# Patient Record
Sex: MALE | Race: WHITE | NOT HISPANIC OR LATINO
[De-identification: names, ages, dates, MRNs, and addresses within clinical notes are randomized per-mention and may not be internally consistent; named-entity substitution may affect disease eponyms.]

---

## 2017-01-20 ENCOUNTER — MEDICATION RENEWAL (OUTPATIENT)
Age: 22
End: 2017-01-20

## 2017-01-24 ENCOUNTER — APPOINTMENT (OUTPATIENT)
Dept: ENDOCRINOLOGY | Facility: CLINIC | Age: 22
End: 2017-01-24

## 2017-01-24 VITALS
BODY MASS INDEX: 31.23 KG/M2 | WEIGHT: 199 LBS | SYSTOLIC BLOOD PRESSURE: 116 MMHG | HEART RATE: 78 BPM | OXYGEN SATURATION: 98 % | HEIGHT: 67 IN | DIASTOLIC BLOOD PRESSURE: 78 MMHG

## 2017-01-24 LAB
GLUCOSE BLDC GLUCOMTR-MCNC: 220
HBA1C MFR BLD HPLC: 10.3

## 2017-03-20 ENCOUNTER — APPOINTMENT (OUTPATIENT)
Dept: ENDOCRINOLOGY | Facility: CLINIC | Age: 22
End: 2017-03-20

## 2017-05-23 ENCOUNTER — APPOINTMENT (OUTPATIENT)
Dept: ENDOCRINOLOGY | Facility: CLINIC | Age: 22
End: 2017-05-23

## 2017-05-23 VITALS
DIASTOLIC BLOOD PRESSURE: 80 MMHG | OXYGEN SATURATION: 97 % | HEIGHT: 67 IN | SYSTOLIC BLOOD PRESSURE: 120 MMHG | WEIGHT: 192 LBS | BODY MASS INDEX: 30.13 KG/M2 | HEART RATE: 100 BPM

## 2017-05-23 LAB
GLUCOSE BLDC GLUCOMTR-MCNC: 278
HBA1C MFR BLD HPLC: 12.7

## 2017-05-26 LAB
25(OH)D3 SERPL-MCNC: 23.8 NG/ML
ALBUMIN SERPL ELPH-MCNC: 4.6 G/DL
ALP BLD-CCNC: 108 U/L
ALT SERPL-CCNC: 22 U/L
ANION GAP SERPL CALC-SCNC: 21 MMOL/L
AST SERPL-CCNC: 21 U/L
BASOPHILS # BLD AUTO: 0.02 K/UL
BASOPHILS NFR BLD AUTO: 0.2 %
BILIRUB SERPL-MCNC: 0.6 MG/DL
BUN SERPL-MCNC: 15 MG/DL
CALCIUM SERPL-MCNC: 10.1 MG/DL
CHLORIDE SERPL-SCNC: 98 MMOL/L
CHOLEST SERPL-MCNC: 247 MG/DL
CHOLEST/HDLC SERPL: 4.8 RATIO
CO2 SERPL-SCNC: 20 MMOL/L
CREAT SERPL-MCNC: 0.74 MG/DL
CREAT SPEC-SCNC: 73 MG/DL
EOSINOPHIL # BLD AUTO: 0.03 K/UL
EOSINOPHIL NFR BLD AUTO: 0.3 %
GLUCOSE SERPL-MCNC: 353 MG/DL
HCT VFR BLD CALC: 49 %
HDLC SERPL-MCNC: 51 MG/DL
HGB BLD-MCNC: 16.7 G/DL
IMM GRANULOCYTES NFR BLD AUTO: 0.2 %
LDLC SERPL CALC-MCNC: 159 MG/DL
LYMPHOCYTES # BLD AUTO: 1.77 K/UL
LYMPHOCYTES NFR BLD AUTO: 16.3 %
MAN DIFF?: NORMAL
MCHC RBC-ENTMCNC: 29.4 PG
MCHC RBC-ENTMCNC: 34.1 GM/DL
MCV RBC AUTO: 86.3 FL
MICROALBUMIN 24H UR DL<=1MG/L-MCNC: 1.4 MG/DL
MICROALBUMIN/CREAT 24H UR-RTO: 19
MONOCYTES # BLD AUTO: 0.44 K/UL
MONOCYTES NFR BLD AUTO: 4 %
NEUTROPHILS # BLD AUTO: 8.6 K/UL
NEUTROPHILS NFR BLD AUTO: 79 %
PLATELET # BLD AUTO: 218 K/UL
POTASSIUM SERPL-SCNC: 4.3 MMOL/L
PROT SERPL-MCNC: 7.5 G/DL
RBC # BLD: 5.68 M/UL
RBC # FLD: 12.1 %
SODIUM SERPL-SCNC: 139 MMOL/L
T4 FREE SERPL-MCNC: 1.4 NG/DL
THYROGLOB AB SERPL-ACNC: <20 IU/ML
THYROPEROXIDASE AB SERPL IA-ACNC: <10 IU/ML
TRIGL SERPL-MCNC: 185 MG/DL
TSH SERPL-ACNC: 2.26 UIU/ML
TTG IGA SER IA-ACNC: 15.9 UNITS
TTG IGA SER-ACNC: NEGATIVE
TTG IGG SER IA-ACNC: <5 UNITS
TTG IGG SER IA-ACNC: NEGATIVE
WBC # FLD AUTO: 10.88 K/UL

## 2017-06-07 ENCOUNTER — RX RENEWAL (OUTPATIENT)
Age: 22
End: 2017-06-07

## 2017-06-29 ENCOUNTER — APPOINTMENT (OUTPATIENT)
Dept: ENDOCRINOLOGY | Facility: CLINIC | Age: 22
End: 2017-06-29

## 2017-07-28 ENCOUNTER — MEDICATION RENEWAL (OUTPATIENT)
Age: 22
End: 2017-07-28

## 2017-10-03 ENCOUNTER — MEDICATION RENEWAL (OUTPATIENT)
Age: 22
End: 2017-10-03

## 2018-02-07 ENCOUNTER — APPOINTMENT (OUTPATIENT)
Dept: ENDOCRINOLOGY | Facility: CLINIC | Age: 23
End: 2018-02-07
Payer: MEDICAID

## 2018-02-07 VITALS
DIASTOLIC BLOOD PRESSURE: 72 MMHG | BODY MASS INDEX: 30.13 KG/M2 | WEIGHT: 192 LBS | HEART RATE: 82 BPM | SYSTOLIC BLOOD PRESSURE: 116 MMHG | HEIGHT: 67 IN | OXYGEN SATURATION: 98 %

## 2018-02-07 LAB
GLUCOSE BLDC GLUCOMTR-MCNC: 200
HBA1C MFR BLD HPLC: 13.7

## 2018-02-07 PROCEDURE — 83036 HEMOGLOBIN GLYCOSYLATED A1C: CPT | Mod: QW

## 2018-02-07 PROCEDURE — 99215 OFFICE O/P EST HI 40 MIN: CPT | Mod: 25

## 2018-02-07 PROCEDURE — 82962 GLUCOSE BLOOD TEST: CPT

## 2018-02-07 RX ORDER — LANCING DEVICE/LANCETS
KIT MISCELLANEOUS
Qty: 1 | Refills: 5 | Status: ACTIVE | COMMUNITY
Start: 2017-10-03 | End: 1900-01-01

## 2018-02-15 ENCOUNTER — RX RENEWAL (OUTPATIENT)
Age: 23
End: 2018-02-15

## 2018-03-15 ENCOUNTER — RX RENEWAL (OUTPATIENT)
Age: 23
End: 2018-03-15

## 2018-06-19 ENCOUNTER — LABORATORY RESULT (OUTPATIENT)
Age: 23
End: 2018-06-19

## 2018-06-19 ENCOUNTER — APPOINTMENT (OUTPATIENT)
Dept: ENDOCRINOLOGY | Facility: CLINIC | Age: 23
End: 2018-06-19
Payer: MEDICAID

## 2018-06-19 VITALS
OXYGEN SATURATION: 98 % | HEIGHT: 67 IN | BODY MASS INDEX: 28.88 KG/M2 | WEIGHT: 184 LBS | DIASTOLIC BLOOD PRESSURE: 82 MMHG | RESPIRATION RATE: 16 BRPM | SYSTOLIC BLOOD PRESSURE: 128 MMHG | HEART RATE: 106 BPM

## 2018-06-19 LAB
GLUCOSE BLDC GLUCOMTR-MCNC: 279
HBA1C MFR BLD HPLC: 11.2

## 2018-06-19 PROCEDURE — 82962 GLUCOSE BLOOD TEST: CPT

## 2018-06-19 PROCEDURE — 83036 HEMOGLOBIN GLYCOSYLATED A1C: CPT | Mod: QW

## 2018-06-19 PROCEDURE — 95251 CONT GLUC MNTR ANALYSIS I&R: CPT

## 2018-06-19 PROCEDURE — 99215 OFFICE O/P EST HI 40 MIN: CPT | Mod: 25

## 2018-06-21 ENCOUNTER — RX RENEWAL (OUTPATIENT)
Age: 23
End: 2018-06-21

## 2018-06-21 LAB
25(OH)D3 SERPL-MCNC: 27.7 NG/ML
ALBUMIN SERPL ELPH-MCNC: 4.7 G/DL
ALP BLD-CCNC: 84 U/L
ALT SERPL-CCNC: 16 U/L
ANION GAP SERPL CALC-SCNC: 19 MMOL/L
AST SERPL-CCNC: 18 U/L
BASOPHILS # BLD AUTO: 0.02 K/UL
BASOPHILS NFR BLD AUTO: 0.2 %
BILIRUB SERPL-MCNC: 0.4 MG/DL
BUN SERPL-MCNC: 13 MG/DL
CALCIUM SERPL-MCNC: 10.2 MG/DL
CHLORIDE SERPL-SCNC: 99 MMOL/L
CHOLEST SERPL-MCNC: 228 MG/DL
CHOLEST/HDLC SERPL: 3.9 RATIO
CO2 SERPL-SCNC: 26 MMOL/L
CREAT SERPL-MCNC: 0.79 MG/DL
CREAT SPEC-SCNC: 72 MG/DL
EOSINOPHIL # BLD AUTO: 0.04 K/UL
EOSINOPHIL NFR BLD AUTO: 0.4 %
GLUCOSE SERPL-MCNC: 282 MG/DL
HCT VFR BLD CALC: 51.6 %
HDLC SERPL-MCNC: 59 MG/DL
HGB BLD-MCNC: 17.2 G/DL
IMM GRANULOCYTES NFR BLD AUTO: 0.2 %
LDLC SERPL CALC-MCNC: 140 MG/DL
LYMPHOCYTES # BLD AUTO: 2.31 K/UL
LYMPHOCYTES NFR BLD AUTO: 22.1 %
MAN DIFF?: NORMAL
MCHC RBC-ENTMCNC: 29.2 PG
MCHC RBC-ENTMCNC: 33.3 GM/DL
MCV RBC AUTO: 87.5 FL
MICROALBUMIN 24H UR DL<=1MG/L-MCNC: 1.5 MG/DL
MICROALBUMIN/CREAT 24H UR-RTO: 21 MG/G
MONOCYTES # BLD AUTO: 0.49 K/UL
MONOCYTES NFR BLD AUTO: 4.7 %
NEUTROPHILS # BLD AUTO: 7.57 K/UL
NEUTROPHILS NFR BLD AUTO: 72.4 %
PLATELET # BLD AUTO: 238 K/UL
POTASSIUM SERPL-SCNC: 4.2 MMOL/L
PROT SERPL-MCNC: 7.9 G/DL
RBC # BLD: 5.9 M/UL
RBC # FLD: 12.2 %
SODIUM SERPL-SCNC: 144 MMOL/L
T4 FREE SERPL-MCNC: 1.4 NG/DL
THYROGLOB AB SERPL-ACNC: <20 IU/ML
THYROPEROXIDASE AB SERPL IA-ACNC: <10 IU/ML
TRIGL SERPL-MCNC: 144 MG/DL
TSH SERPL-ACNC: 2.23 UIU/ML
TTG IGA SER IA-ACNC: 23.5 UNITS
TTG IGA SER-ACNC: ABNORMAL
TTG IGG SER IA-ACNC: <5 UNITS
TTG IGG SER IA-ACNC: NEGATIVE
WBC # FLD AUTO: 10.45 K/UL

## 2018-06-21 RX ORDER — BLOOD-GLUCOSE METER
W/DEVICE KIT MISCELLANEOUS
Qty: 1 | Refills: 0 | Status: COMPLETED | COMMUNITY
Start: 2018-06-19 | End: 2018-06-21

## 2018-06-21 RX ORDER — BLOOD SUGAR DIAGNOSTIC
STRIP MISCELLANEOUS
Qty: 6 | Refills: 5 | Status: ACTIVE | COMMUNITY
Start: 2018-06-21 | End: 1900-01-01

## 2018-06-21 RX ORDER — LANCETS 28 GAUGE
EACH MISCELLANEOUS
Qty: 2 | Refills: 3 | Status: ACTIVE | COMMUNITY
Start: 2018-06-21 | End: 1900-01-01

## 2018-06-21 RX ORDER — BLOOD-GLUCOSE METER
KIT MISCELLANEOUS
Qty: 1 | Refills: 4 | Status: ACTIVE | COMMUNITY
Start: 2018-06-21 | End: 1900-01-01

## 2018-06-22 ENCOUNTER — RX RENEWAL (OUTPATIENT)
Age: 23
End: 2018-06-22

## 2018-06-22 RX ORDER — LANCETS
EACH MISCELLANEOUS
Qty: 100 | Refills: 0 | Status: ACTIVE | COMMUNITY
Start: 2018-06-22 | End: 1900-01-01

## 2018-08-06 ENCOUNTER — MEDICATION RENEWAL (OUTPATIENT)
Age: 23
End: 2018-08-06

## 2018-10-29 ENCOUNTER — APPOINTMENT (OUTPATIENT)
Dept: ENDOCRINOLOGY | Facility: CLINIC | Age: 23
End: 2018-10-29
Payer: MEDICAID

## 2018-10-29 VITALS — HEART RATE: 115 BPM | DIASTOLIC BLOOD PRESSURE: 80 MMHG | OXYGEN SATURATION: 98 % | SYSTOLIC BLOOD PRESSURE: 120 MMHG

## 2018-10-29 LAB — GLUCOSE BLDC GLUCOMTR-MCNC: 242

## 2018-10-29 PROCEDURE — 90686 IIV4 VACC NO PRSV 0.5 ML IM: CPT

## 2018-10-29 PROCEDURE — G0008: CPT

## 2018-10-29 PROCEDURE — 82962 GLUCOSE BLOOD TEST: CPT

## 2018-10-29 PROCEDURE — 99215 OFFICE O/P EST HI 40 MIN: CPT | Mod: 25

## 2018-10-29 PROCEDURE — 95251 CONT GLUC MNTR ANALYSIS I&R: CPT

## 2018-11-12 ENCOUNTER — RX RENEWAL (OUTPATIENT)
Age: 23
End: 2018-11-12

## 2018-11-15 ENCOUNTER — CLINICAL ADVICE (OUTPATIENT)
Age: 23
End: 2018-11-15

## 2018-12-03 ENCOUNTER — APPOINTMENT (OUTPATIENT)
Dept: ENDOCRINOLOGY | Facility: CLINIC | Age: 23
End: 2018-12-03
Payer: COMMERCIAL

## 2018-12-03 PROCEDURE — P0006: CPT

## 2018-12-03 RX ORDER — BLOOD-GLUCOSE METER
EACH MISCELLANEOUS
Qty: 1 | Refills: 0 | Status: ACTIVE | COMMUNITY
Start: 2018-12-03 | End: 1900-01-01

## 2018-12-03 RX ORDER — BLOOD KETONE TEST, STRIPS
STRIP MISCELLANEOUS
Qty: 2 | Refills: 1 | Status: ACTIVE | COMMUNITY
Start: 2018-12-03 | End: 1900-01-01

## 2019-01-10 ENCOUNTER — MEDICATION RENEWAL (OUTPATIENT)
Age: 24
End: 2019-01-10

## 2019-01-14 ENCOUNTER — RX RENEWAL (OUTPATIENT)
Age: 24
End: 2019-01-14

## 2019-01-14 RX ORDER — 70%ISOPROPYL ALCOHOL 0.7 ML/ML
70 SWAB TOPICAL
Qty: 3 | Refills: 3 | Status: ACTIVE | COMMUNITY
Start: 2019-01-14 | End: 1900-01-01

## 2019-01-28 ENCOUNTER — APPOINTMENT (OUTPATIENT)
Dept: ENDOCRINOLOGY | Facility: CLINIC | Age: 24
End: 2019-01-28
Payer: MEDICAID

## 2019-01-28 VITALS — WEIGHT: 180 LBS | OXYGEN SATURATION: 98 % | HEART RATE: 98 BPM

## 2019-01-28 LAB
GLUCOSE BLDC GLUCOMTR-MCNC: 189
HBA1C MFR BLD HPLC: 9.3

## 2019-01-28 PROCEDURE — 83036 HEMOGLOBIN GLYCOSYLATED A1C: CPT | Mod: QW

## 2019-01-28 PROCEDURE — 82962 GLUCOSE BLOOD TEST: CPT

## 2019-01-28 PROCEDURE — 99215 OFFICE O/P EST HI 40 MIN: CPT | Mod: 25

## 2019-01-28 PROCEDURE — 95251 CONT GLUC MNTR ANALYSIS I&R: CPT

## 2019-01-28 NOTE — REVIEW OF SYSTEMS
[All other systems negative] : All other systems negative [Fatigue] : no fatigue [Recent Weight Gain (___ Lbs)] : no recent weight gain [Recent Weight Loss (___ Lbs)] : no recent weight loss [Blurry Vision] : no blurred vision [Dysphagia] : no dysphagia [Dysphonia] : no dysphonia [Chest Pain] : no chest pain [Shortness Of Breath] : no shortness of breath [Nausea] : no nausea [Vomiting] : no vomiting was observed [Cold Intolerance] : cold tolerant [Heat Intolerance] : heat tolerant

## 2019-01-28 NOTE — DATA REVIEWED
[FreeTextEntry1] : DEXCOM downloaded and reviewed personally 1/28/19: Hyperglycemia persistently, but improving without hypoglycemia.\par \par Labs 1/28/19:\par A1c 9.3%\par Glucose 189\par \par Labs 12/31/18:\par A1c 10.4%\par Glucose 325\par CMP normal\par Chol 235\par HDL 62\par \par Trig 97\par Vit D 25-OH 27\par \par DEXCOM downloaded and reviewed personally 10/29/18: Hyperglycemia without hypoglycemia.\par \par Labs 10/29/18:\par A1c 10.4%\par Glucose 242\par \par DEXCOM download: Hyperglycemia mostly after dinner. No hypoglycemia. \par \par Labs 6/19/18:\par A1c 11.2%\par Glucose 279\par Micro/cr 21\par CMP normal except glucose of 282\par \par HDL 59\par Chol 228\par Trig 144\par Vit D 27.7\par TSH 2.23\par Free T4 1.4\par TPO Ab neg.\par Celiac screen +\par \par Labs 2/7/18:\par Glucose 200\par A1c 13.7%\par \par Sensor Download 5/23/17: Glucose values 200-400 consistently\par \par Labs 5/23/17:\par A1c 12.7%\par Glucose 278\par TSH 2.26\par Free T4 1.4\par Glucose 353\par \par HDL 51\par Chol 247\par Trig 185\par Micro/Cr 19\par Vit D 23.8\par Celiac neg.\par TPO Ab neg. \par \par Labs 5/2016:\par A1c 10.1%\par Glucose 220\par \par Labs 2/22/15:\par A1c 11%\par Glucose 142\par \par Reviewed pump download 2/22/16: Most hyperglycemia in afternoon and evening- related to postprandial excursions. \par \par Labs 12/2015:\par A1c >14%\par Celiac negative\par Micro/Cr 9\par \par HDL 58\par Chol 237\par Trig 140\par CMP normal except glucose of 247\par TSH 2.14\par Free T4 1.3\par \par Reviewed pump down load with persistent hyperglycemia glucose values 200-400 without hypoglycemia.

## 2019-01-28 NOTE — ASSESSMENT
[Carbohydrate Consistent Diet] : carbohydrate consistent diet [Hypoglycemia Management] : hypoglycemia management [Diabetes Foot Care] : diabetes foot care [Long Term Vascular Complications] : long term vascular complications of diabetes [Importance of Diet and Exercise] : importance of diet and exercise to improve glycemic control, achieve weight loss and improve cardiovascular health [Self Monitoring of Blood Glucose] : self monitoring of blood glucose [Retinopathy Screening] : Patient was referred to ophthalmology for retinopathy screening [FreeTextEntry1] : 24 y/o M w/ uncontrolled Type 1 DM on insulin pump with A1c 9.3% (high risk patient with high level decision-making) Discussed the importance of improved glycemic control to prevent long term complications from diabetes. Goal A1c <7%. A1c today 9.3% likely due to nonadherence of carb counting for fear of hypoglycemia. Recommend decrease in insulin doses to allow slow titration where he feels comfortable bolusing recommended amount as he does not bolus full amounts anyway.  Basal rate as indicated:\par \par 12am 1.7\par 6am 1.8\par 9:30am 1.9\par 1pm 2.1\par 6pm 2.1\par 9pm 2.1\par \par c/w I:C at 12am 9, 9 am 3, and 13:30 3.5. \par ChangeD sensitivity to 50 starting at 10 pm until 6 am to avoid overcorrection overnight when he seems to be more sensitive. Change target to 120-140 given fear of hypoglycemia. \par \par Dietary and lifestyle modifications discussed. Goal glucose values reviewed. Hypoglycemia management discussed. BP at goal. micro/cr at goal and negative 11/2017. Repeat labs 6/2019. Has glucagon and ketone strips. Medical alert bracelet recommended. Optho follow-up for retinopathy screening. \par GI follow-up for celiac + screen\par Influenza vaccine given 10/2018.

## 2019-01-28 NOTE — PHYSICAL EXAM
[Alert] : alert [No Acute Distress] : no acute distress [Well Nourished] : well nourished [Well Developed] : well developed [Normal Sclera/Conjunctiva] : normal sclera/conjunctiva [EOMI] : extra ocular movement intact [Normal Oropharynx] : the oropharynx was normal [Supple] : the neck was supple [Thyroid Not Enlarged] : the thyroid was not enlarged [No Respiratory Distress] : no respiratory distress [Normal Rate and Effort] : normal respiratory rhythm and effort [No Accessory Muscle Use] : no accessory muscle use [Clear to Auscultation] : lungs were clear to auscultation bilaterally [Normal S1, S2] : normal S1 and S2 [Regular Rhythm] : with a regular rhythm [No Edema] : there was no peripheral edema [Normal Bowel Sounds] : normal bowel sounds [Not Tender] : non-tender [Soft] : abdomen soft [Not Distended] : not distended [No Stigmata of Cushings Syndrome] : no stigmata of cushings syndrome [Normal Gait] : normal gait [No Clubbing, Cyanosis] : no clubbing  or cyanosis of the fingernails [Normal Strength/Tone] : muscle strength and tone were normal [No Tremors] : no tremors [Oriented x3] : oriented to person, place, and time [Normal Affect] : the affect was normal [Normal Mood] : the mood was normal [Kyphosis] : no kyphosis present [Acanthosis Nigricans] : no acanthosis nigricans [de-identified] : +tachycardic [de-identified] : 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

## 2019-02-11 ENCOUNTER — MEDICATION RENEWAL (OUTPATIENT)
Age: 24
End: 2019-02-11

## 2019-05-06 ENCOUNTER — APPOINTMENT (OUTPATIENT)
Dept: ENDOCRINOLOGY | Facility: CLINIC | Age: 24
End: 2019-05-06
Payer: MEDICAID

## 2019-05-06 ENCOUNTER — RESULT CHARGE (OUTPATIENT)
Age: 24
End: 2019-05-06

## 2019-05-06 VITALS
OXYGEN SATURATION: 98 % | HEIGHT: 67 IN | BODY MASS INDEX: 28.25 KG/M2 | WEIGHT: 180 LBS | SYSTOLIC BLOOD PRESSURE: 118 MMHG | HEART RATE: 102 BPM | DIASTOLIC BLOOD PRESSURE: 80 MMHG

## 2019-05-06 LAB — HBA1C MFR BLD HPLC: 9.4

## 2019-05-06 PROCEDURE — 99215 OFFICE O/P EST HI 40 MIN: CPT

## 2019-05-06 RX ORDER — INSULIN LISPRO 100 U/ML
100 INJECTION, SOLUTION INTRAVENOUS; SUBCUTANEOUS
Qty: 7 | Refills: 1 | Status: COMPLETED | COMMUNITY
Start: 2019-01-11 | End: 2019-05-06

## 2019-05-06 NOTE — PHYSICAL EXAM
[Well Nourished] : well nourished [No Acute Distress] : no acute distress [Alert] : alert [Normal Sclera/Conjunctiva] : normal sclera/conjunctiva [EOMI] : extra ocular movement intact [Well Developed] : well developed [Normal Oropharynx] : the oropharynx was normal [Supple] : the neck was supple [Thyroid Not Enlarged] : the thyroid was not enlarged [No Respiratory Distress] : no respiratory distress [Normal Rate and Effort] : normal respiratory rhythm and effort [No Accessory Muscle Use] : no accessory muscle use [Normal S1, S2] : normal S1 and S2 [Clear to Auscultation] : lungs were clear to auscultation bilaterally [Regular Rhythm] : with a regular rhythm [No Edema] : there was no peripheral edema [Not Tender] : non-tender [Normal Bowel Sounds] : normal bowel sounds [Not Distended] : not distended [Soft] : abdomen soft [Normal Gait] : normal gait [No Stigmata of Cushings Syndrome] : no stigmata of cushings syndrome [No Clubbing, Cyanosis] : no clubbing  or cyanosis of the fingernails [No Tremors] : no tremors [Normal Strength/Tone] : muscle strength and tone were normal [Normal Affect] : the affect was normal [Oriented x3] : oriented to person, place, and time [Normal Mood] : the mood was normal [Kyphosis] : no kyphosis present [de-identified] : +tachycardic [de-identified] : 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

## 2019-05-06 NOTE — ASSESSMENT
[Hypoglycemia Management] : hypoglycemia management [Carbohydrate Consistent Diet] : carbohydrate consistent diet [Diabetes Foot Care] : diabetes foot care [Long Term Vascular Complications] : long term vascular complications of diabetes [Importance of Diet and Exercise] : importance of diet and exercise to improve glycemic control, achieve weight loss and improve cardiovascular health [Self Monitoring of Blood Glucose] : self monitoring of blood glucose [Retinopathy Screening] : Patient was referred to ophthalmology for retinopathy screening [FreeTextEntry1] : 24 y/o M w/ uncontrolled Type 1 DM on insulin pump with A1c 9.4% (high risk patient with high level decision-making) Discussed the importance of improved glycemic control to prevent long term complications from diabetes. Goal A1c <7%. A1c today 9.4% likely due to nonadherence of carb counting for fear of hypoglycemia. Recommend decrease in insulin doses to allow slow titration where he feels comfortable bolusing recommended amount as he does not bolus full amounts anyway.  Basal rate as indicated:\par \par Pump Settings: \par Basal Rate: \par Start Time: 12:00 AM ----- 1.8 units/hour \par Start Time: 630A -----  1.8 units/hour \par Start Time: 9A -----  1.9 units/hour \par Start Time: 1P ----- 2.1 units/hour \par Start Time: 6P -----  2.1 units/hour \par Start Time: 9P -----  \par 2.1 units/hour \par Start Time: 10P -----  2.1 units/hour   \par Insulin to Carb Ration (I:C): \par Start Time: 12:00 AM ----- 9 units/hour \par Start Time: -----  units/hour \par Start Time: 9A -----  3 units/hour \par Start Time: 130PM -----  3.5 units/hour      \par Insulin Sensitivity Factor = 12 A 50, 630A 30, 10 P 50 \par BG Target = 140 \par Insulin on Board (IOB) Duration = 4 hours \par Auto off set = 18 hours \par Maximum Basal Rate = units/hour \par Maximum Bolus = 20 units \par \par Dietary and lifestyle modifications discussed. Goal glucose values reviewed. Hypoglycemia management discussed. BP at goal. micro/cr at goal and negative 11/2017. Repeat labs 6/2019. Has glucagon and ketone strips. Medical alert bracelet recommended. Optho follow-up for retinopathy screening. \par GI follow-up for celiac + screen\par Influenza vaccine given 10/2018.

## 2019-05-06 NOTE — HISTORY OF PRESENT ILLNESS
[FreeTextEntry1] : Magdaleno is a 25 y/o male with type 1 diabetes diagnosed in 2010 with symptoms of polydipsia found to have hyperglycemia on labs. No known complications. Sent to [facility]. Discharged on Lantus and Novolog. Never in DKA. Was on the Medtronic pump since 2010 now on T-Slim since 12/2018 with Novolog. Also using basal IQ.  \par \par Pump Settings: \par Basal Rate: \par Start Time: 12:00 AM ----- 1.8 units/hour \par Start Time: 630A -----  1.8 units/hour \par Start Time: 9A -----  1.9 units/hour \par Start Time: 1P ----- 2.1 units/hour \par Start Time: 6P -----  2.1 units/hour \par Start Time: 9P -----  \par 2.1 units/hour \par Start Time: 10P -----  2.1 units/hour   \par Insulin to Carb Ration (I:C): \par Start Time: 12:00 AM ----- 9 units/hour \par Start Time: -----  units/hour \par Start Time: 9A -----  3 units/hour \par Start Time: 130PM -----  3.5 units/hour      \par Insulin Sensitivity Factor = 12 A 50, 630A 30, 10 P 50 \par BG Target = 140 \par Insulin on Board (IOB) Duration = 4 hours \par Auto off set = 18 hours \par Maximum Basal Rate = units/hour \par Maximum Bolus = 20 units \par \par \par He used to follow with Dr. Galen Lawson last seen August 2014. Seen initially by me 12/14/15 with A1c of >14%. due to fear of hypoglycemia. He was not giving himself full bolus doses and basal rates. Checks glucose 2-3x/day with values 200-400 on average. Knows how carb count. Uses bolus wizard. Overrides frequently. Now giving full bolus at breakfast and trying to give at least 50% of bolus at snacks and lunch and dinner for fear of going low. Still aims to go to sleep with glucose >250 as he is afraid to go to bed with lower amounts. Also concerned that he is waking up in the 90's. Tries to monitor carbs. No soda. +juice if thinks he will go low. Has a snack at bedtime for fear of hypoglycemia. The pump insertion site is changed every 3 days. Site of injection/insertion: abdomen. No polyuria or polydipsia. Denies nausea or vomiting. The patient has glucagon at home, but has no history of hypoglycemic seizure. Does not have medical alert bracelet. Had a baby boy 5/2018. Sleep has been less consistent since then. \par  \par +Celiac screen, has not yet seen GI. Cut out cake and some carbs from diet with improvement in abdominal symptoms.

## 2019-05-06 NOTE — DATA REVIEWED
[FreeTextEntry1] : Labs 5/6/19:\par A1c 9.4%\par \par DEXCOM downloaded and reviewed personally 1/28/19: Hyperglycemia persistently, but improving without hypoglycemia.\par \par Labs 1/28/19:\par A1c 9.3%\par Glucose 189\par \par Labs 12/31/18:\par A1c 10.4%\par Glucose 325\par CMP normal\par Chol 235\par HDL 62\par \par Trig 97\par Vit D 25-OH 27\par \par DEXCOM downloaded and reviewed personally 10/29/18: Hyperglycemia without hypoglycemia.\par \par Labs 10/29/18:\par A1c 10.4%\par Glucose 242\par \par DEXCOM download: Hyperglycemia mostly after dinner. No hypoglycemia. \par \par Labs 6/19/18:\par A1c 11.2%\par Glucose 279\par Micro/cr 21\par CMP normal except glucose of 282\par \par HDL 59\par Chol 228\par Trig 144\par Vit D 27.7\par TSH 2.23\par Free T4 1.4\par TPO Ab neg.\par Celiac screen +\par \par Labs 2/7/18:\par Glucose 200\par A1c 13.7%\par \par Sensor Download 5/23/17: Glucose values 200-400 consistently\par \par Labs 5/23/17:\par A1c 12.7%\par Glucose 278\par TSH 2.26\par Free T4 1.4\par Glucose 353\par \par HDL 51\par Chol 247\par Trig 185\par Micro/Cr 19\par Vit D 23.8\par Celiac neg.\par TPO Ab neg. \par \par Labs 5/2016:\par A1c 10.1%\par Glucose 220\par \par Labs 2/22/15:\par A1c 11%\par Glucose 142\par \par Reviewed pump download 2/22/16: Most hyperglycemia in afternoon and evening- related to postprandial excursions. \par \par Labs 12/2015:\par A1c >14%\par Celiac negative\par Micro/Cr 9\par \par HDL 58\par Chol 237\par Trig 140\par CMP normal except glucose of 247\par TSH 2.14\par Free T4 1.3\par \par Reviewed pump down load with persistent hyperglycemia glucose values 200-400 without hypoglycemia.

## 2019-05-06 NOTE — REVIEW OF SYSTEMS
[All other systems negative] : All other systems negative [Fatigue] : no fatigue [Recent Weight Gain (___ Lbs)] : no recent weight gain [Blurry Vision] : no blurred vision [Recent Weight Loss (___ Lbs)] : no recent weight loss [Chest Pain] : no chest pain [Dysphagia] : no dysphagia [Dysphonia] : no dysphonia [Nausea] : no nausea [Shortness Of Breath] : no shortness of breath [Vomiting] : no vomiting was observed [Cold Intolerance] : cold tolerant [Heat Intolerance] : heat tolerant

## 2019-05-28 ENCOUNTER — RX RENEWAL (OUTPATIENT)
Age: 24
End: 2019-05-28

## 2019-06-18 ENCOUNTER — CLINICAL ADVICE (OUTPATIENT)
Age: 24
End: 2019-06-18

## 2019-06-27 ENCOUNTER — RX RENEWAL (OUTPATIENT)
Age: 24
End: 2019-06-27

## 2019-06-27 RX ORDER — BLOOD SUGAR DIAGNOSTIC
STRIP MISCELLANEOUS
Qty: 600 | Refills: 3 | Status: ACTIVE | COMMUNITY
Start: 2019-06-27 | End: 1900-01-01

## 2019-07-10 ENCOUNTER — RX RENEWAL (OUTPATIENT)
Age: 24
End: 2019-07-10

## 2019-07-10 RX ORDER — BLOOD-GLUCOSE,RECEIVER,CONT
EACH MISCELLANEOUS
Qty: 1 | Refills: 5 | Status: ACTIVE | COMMUNITY
Start: 2018-08-06 | End: 1900-01-01

## 2019-07-19 ENCOUNTER — RX RENEWAL (OUTPATIENT)
Age: 24
End: 2019-07-19

## 2019-07-19 RX ORDER — ADHESIVE REMOVER
PACKET (EA) MISCELLANEOUS
Qty: 1 | Refills: 5 | Status: ACTIVE | COMMUNITY
Start: 2019-07-19 | End: 1900-01-01

## 2019-07-19 RX ORDER — TRANSPARENT DRESSING 4"X4 3/4"
BANDAGE TOPICAL
Qty: 30 | Refills: 5 | Status: ACTIVE | COMMUNITY
Start: 2019-07-19 | End: 1900-01-01

## 2019-08-06 ENCOUNTER — APPOINTMENT (OUTPATIENT)
Dept: ENDOCRINOLOGY | Facility: CLINIC | Age: 24
End: 2019-08-06
Payer: MEDICAID

## 2019-08-06 VITALS
DIASTOLIC BLOOD PRESSURE: 80 MMHG | SYSTOLIC BLOOD PRESSURE: 122 MMHG | HEART RATE: 108 BPM | WEIGHT: 182 LBS | OXYGEN SATURATION: 98 %

## 2019-08-06 LAB
GLUCOSE BLDC GLUCOMTR-MCNC: 212
HBA1C MFR BLD HPLC: 8.8

## 2019-08-06 PROCEDURE — 95251 CONT GLUC MNTR ANALYSIS I&R: CPT

## 2019-08-06 PROCEDURE — 83036 HEMOGLOBIN GLYCOSYLATED A1C: CPT | Mod: QW

## 2019-08-06 PROCEDURE — 99215 OFFICE O/P EST HI 40 MIN: CPT | Mod: 25

## 2019-08-06 PROCEDURE — 82962 GLUCOSE BLOOD TEST: CPT

## 2019-08-06 NOTE — DATA REVIEWED
[FreeTextEntry1] : Labs 8/6/19:\par A1c 8.8%\par \par DEXCOM downloaded and reviewed personally 8/6/19: Hyperglycemia less compared to previous downloads. Most of which are overnight and after dinner. No hypoglycemia. \par \par Labs 5/6/19:\par A1c 9.4%\par \par DEXCOM downloaded and reviewed personally 1/28/19: Hyperglycemia persistently, but improving without hypoglycemia.\par \par Labs 1/28/19:\par A1c 9.3%\par Glucose 189\par \par Labs 12/31/18:\par A1c 10.4%\par Glucose 325\par CMP normal\par Chol 235\par HDL 62\par \par Trig 97\par Vit D 25-OH 27\par \par DEXCOM downloaded and reviewed personally 10/29/18: Hyperglycemia without hypoglycemia.\par \par Labs 10/29/18:\par A1c 10.4%\par Glucose 242\par \par DEXCOM download: Hyperglycemia mostly after dinner. No hypoglycemia. \par \par Labs 6/19/18:\par A1c 11.2%\par Glucose 279\par Micro/cr 21\par CMP normal except glucose of 282\par \par HDL 59\par Chol 228\par Trig 144\par Vit D 27.7\par TSH 2.23\par Free T4 1.4\par TPO Ab neg.\par Celiac screen +\par \par Labs 2/7/18:\par Glucose 200\par A1c 13.7%\par \par Sensor Download 5/23/17: Glucose values 200-400 consistently\par \par Labs 5/23/17:\par A1c 12.7%\par Glucose 278\par TSH 2.26\par Free T4 1.4\par Glucose 353\par \par HDL 51\par Chol 247\par Trig 185\par Micro/Cr 19\par Vit D 23.8\par Celiac neg.\par TPO Ab neg. \par \par Labs 5/2016:\par A1c 10.1%\par Glucose 220\par \par Labs 2/22/15:\par A1c 11%\par Glucose 142\par \par Reviewed pump download 2/22/16: Most hyperglycemia in afternoon and evening- related to postprandial excursions. \par \par Labs 12/2015:\par A1c >14%\par Celiac negative\par Micro/Cr 9\par \par HDL 58\par Chol 237\par Trig 140\par CMP normal except glucose of 247\par TSH 2.14\par Free T4 1.3\par \par Reviewed pump down load with persistent hyperglycemia glucose values 200-400 without hypoglycemia.

## 2019-08-06 NOTE — ASSESSMENT
[Carbohydrate Consistent Diet] : carbohydrate consistent diet [Hypoglycemia Management] : hypoglycemia management [Diabetes Foot Care] : diabetes foot care [Long Term Vascular Complications] : long term vascular complications of diabetes [Importance of Diet and Exercise] : importance of diet and exercise to improve glycemic control, achieve weight loss and improve cardiovascular health [Self Monitoring of Blood Glucose] : self monitoring of blood glucose [Retinopathy Screening] : Patient was referred to ophthalmology for retinopathy screening [FreeTextEntry1] : 25 y/o M w/ uncontrolled Type 1 DM on insulin pump with A1c 8.8% (high risk patient with high level decision-making) Discussed the importance of improved glycemic control to prevent long term complications from diabetes. Goal A1c <7%. A1c today 8.8% now improving slowly with improved adherence of carb counting for fear of hypoglycemia. Recommend decrease in insulin doses to allow slow titration where he feels comfortable bolusing recommended amount as he does not bolus full amounts anyway.  Basal rate as indicated:\par \par Basal Rate: \par Start Time: 12:00 AM ----- 2.0 units/hour \par Start Time: 630A -----  1.8 units/hour \par Start Time: 9A -----  Increase to 2.0 units/hour \par Start Time: 1P ----- 2.1 units/hour \par Start Time: 1030P -----  2.1 units/hour \par   \par Insulin to Carb Ration (I:C): \par Start Time: 12:00 AM ----- 9 units/hour \par Start Time: -----  units/hour \par Start Time: 9A -----  3 units/hour \par Start Time: 130PM -----  3.5 units/hour      \par Insulin Sensitivity Factor = 12 A 50, 630A 30, 10 P 50 \par BG Target = 140 \par Insulin on Board (IOB) Duration = 4 hours \par Auto off set = 18 hours \par Maximum Basal Rate = units/hour \par Maximum Bolus = 20 units \par \par \par Dietary and lifestyle modifications discussed. Goal glucose values reviewed. Hypoglycemia management discussed. BP at goal. micro/cr at goal and negative 11/2017. Repeat labs today. Has glucagon and ketone strips. Medical alert bracelet recommended. Optho follow-up for retinopathy screening. \par GI follow-up for celiac + screen\par

## 2019-08-06 NOTE — HISTORY OF PRESENT ILLNESS
[FreeTextEntry1] : Magdaleno is a 23 y/o male with type 1 diabetes diagnosed in 2010 with symptoms of polydipsia found to have hyperglycemia on labs. No known complications. Sent to [facility]. Discharged on Lantus and Novolog. Never in DKA. Was on the Medtronic pump since 2010 now on T-Slim since 12/2018 with Novolog. Also using basal IQ.  \par \par Pump Settings: \par Basal Rate: \par Start Time: 12:00 AM ----- 2.0 units/hour \par Start Time: 630A -----  1.8 units/hour \par Start Time: 9A -----  1.9 units/hour \par Start Time: 1P ----- 2.1 units/hour \par Start Time: 1030P -----  2.1 units/hour \par   \par Insulin to Carb Ration (I:C): \par Start Time: 12:00 AM ----- 9 units/hour \par Start Time: -----  units/hour \par Start Time: 9A -----  3 units/hour \par Start Time: 130PM -----  3.5 units/hour      \par Insulin Sensitivity Factor = 12 A 50, 630A 30, 10 P 50 \par BG Target = 140 \par Insulin on Board (IOB) Duration = 4 hours \par Auto off set = 18 hours \par Maximum Basal Rate = units/hour \par Maximum Bolus = 20 units \par \par \par He used to follow with Dr. Galen Lawson last seen August 2014. Seen initially by me 12/14/15 with A1c of >14%. due to fear of hypoglycemia. He was not giving himself full bolus doses and basal rates. Checks glucose 2-3x/day with values 200-400 on average. Knows how carb count. Uses bolus wizard. Overrides frequently. Now giving full bolus at breakfast and trying to give at least 50% of bolus at snacks and lunch and dinner for fear of going low. Now feels more comfortable going to bed with glucose values in the 100's as he has the sensor. Tries to monitor carbs. No soda. +juice if thinks he will go low. Has a snack at bedtime for fear of hypoglycemia. The pump insertion site is changed every 3 days. Site of injection/insertion: abdomen. No polyuria or polydipsia. Denies nausea or vomiting. The patient has glucagon at home, but has no history of hypoglycemic seizure. Does not have medical alert bracelet. Had a baby boy 5/2018. \par  \par +Celiac screen, has not yet seen GI. Cut out cake and some carbs from diet with improvement in abdominal symptoms.

## 2019-08-06 NOTE — REVIEW OF SYSTEMS
[All other systems negative] : All other systems negative [Fatigue] : no fatigue [Recent Weight Gain (___ Lbs)] : no recent weight gain [Recent Weight Loss (___ Lbs)] : no recent weight loss [Blurry Vision] : no blurred vision [Dysphagia] : no dysphagia [Dysphonia] : no dysphonia [Chest Pain] : no chest pain [Shortness Of Breath] : no shortness of breath [Vomiting] : no vomiting was observed [Nausea] : no nausea [Cold Intolerance] : cold tolerant [Heat Intolerance] : heat tolerant

## 2019-08-06 NOTE — PHYSICAL EXAM
[Alert] : alert [No Acute Distress] : no acute distress [Well Developed] : well developed [Well Nourished] : well nourished [Normal Sclera/Conjunctiva] : normal sclera/conjunctiva [EOMI] : extra ocular movement intact [Normal Oropharynx] : the oropharynx was normal [Thyroid Not Enlarged] : the thyroid was not enlarged [Supple] : the neck was supple [No Respiratory Distress] : no respiratory distress [Normal Rate and Effort] : normal respiratory rhythm and effort [No Accessory Muscle Use] : no accessory muscle use [Clear to Auscultation] : lungs were clear to auscultation bilaterally [Normal S1, S2] : normal S1 and S2 [Regular Rhythm] : with a regular rhythm [No Edema] : there was no peripheral edema [Not Tender] : non-tender [Normal Bowel Sounds] : normal bowel sounds [Soft] : abdomen soft [Not Distended] : not distended [No Stigmata of Cushings Syndrome] : no stigmata of cushings syndrome [Normal Gait] : normal gait [Normal Strength/Tone] : muscle strength and tone were normal [No Clubbing, Cyanosis] : no clubbing  or cyanosis of the fingernails [No Tremors] : no tremors [Oriented x3] : oriented to person, place, and time [Normal Affect] : the affect was normal [Normal Mood] : the mood was normal [Kyphosis] : no kyphosis present [Acanthosis Nigricans] : no acanthosis nigricans [de-identified] : +tachycardic [de-identified] : 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

## 2019-08-08 LAB
ALBUMIN SERPL ELPH-MCNC: 4.6 G/DL
ALP BLD-CCNC: 76 U/L
ALT SERPL-CCNC: 14 U/L
ANION GAP SERPL CALC-SCNC: 10 MMOL/L
AST SERPL-CCNC: 14 U/L
BASOPHILS # BLD AUTO: 0.03 K/UL
BASOPHILS NFR BLD AUTO: 0.3 %
BILIRUB SERPL-MCNC: 0.4 MG/DL
BUN SERPL-MCNC: 13 MG/DL
CALCIUM SERPL-MCNC: 10 MG/DL
CHLORIDE SERPL-SCNC: 102 MMOL/L
CHOLEST SERPL-MCNC: 188 MG/DL
CHOLEST/HDLC SERPL: 3.2 RATIO
CO2 SERPL-SCNC: 27 MMOL/L
CREAT SERPL-MCNC: 0.66 MG/DL
CREAT SPEC-SCNC: 130 MG/DL
EOSINOPHIL # BLD AUTO: 0.02 K/UL
EOSINOPHIL NFR BLD AUTO: 0.2 %
GLUCOSE SERPL-MCNC: 228 MG/DL
HCT VFR BLD CALC: 49.1 %
HDLC SERPL-MCNC: 59 MG/DL
HGB BLD-MCNC: 16 G/DL
IMM GRANULOCYTES NFR BLD AUTO: 0.3 %
LDLC SERPL CALC-MCNC: 105 MG/DL
LYMPHOCYTES # BLD AUTO: 2.02 K/UL
LYMPHOCYTES NFR BLD AUTO: 19.7 %
MAN DIFF?: NORMAL
MCHC RBC-ENTMCNC: 29 PG
MCHC RBC-ENTMCNC: 32.6 GM/DL
MCV RBC AUTO: 88.9 FL
MICROALBUMIN 24H UR DL<=1MG/L-MCNC: <1.2 MG/DL
MICROALBUMIN/CREAT 24H UR-RTO: NORMAL MG/G
MONOCYTES # BLD AUTO: 0.5 K/UL
MONOCYTES NFR BLD AUTO: 4.9 %
NEUTROPHILS # BLD AUTO: 7.65 K/UL
NEUTROPHILS NFR BLD AUTO: 74.6 %
PLATELET # BLD AUTO: 238 K/UL
POTASSIUM SERPL-SCNC: 4.4 MMOL/L
PROT SERPL-MCNC: 7 G/DL
RBC # BLD: 5.52 M/UL
RBC # FLD: 11.9 %
SODIUM SERPL-SCNC: 139 MMOL/L
T4 FREE SERPL-MCNC: 1.4 NG/DL
THYROGLOB AB SERPL-ACNC: <20 IU/ML
THYROPEROXIDASE AB SERPL IA-ACNC: <10 IU/ML
TRIGL SERPL-MCNC: 120 MG/DL
TSH SERPL-ACNC: 1.99 UIU/ML
WBC # FLD AUTO: 10.25 K/UL

## 2019-10-18 ENCOUNTER — RX RENEWAL (OUTPATIENT)
Age: 24
End: 2019-10-18

## 2020-01-28 ENCOUNTER — RESULT CHARGE (OUTPATIENT)
Age: 25
End: 2020-01-28

## 2020-01-28 ENCOUNTER — APPOINTMENT (OUTPATIENT)
Dept: ENDOCRINOLOGY | Facility: CLINIC | Age: 25
End: 2020-01-28
Payer: MEDICAID

## 2020-01-28 VITALS
DIASTOLIC BLOOD PRESSURE: 96 MMHG | WEIGHT: 185 LBS | OXYGEN SATURATION: 99 % | SYSTOLIC BLOOD PRESSURE: 148 MMHG | RESPIRATION RATE: 20 BRPM | TEMPERATURE: 99 F | HEIGHT: 67 IN | BODY MASS INDEX: 29.03 KG/M2 | HEART RATE: 111 BPM

## 2020-01-28 LAB — HBA1C MFR BLD HPLC: 9.9

## 2020-01-28 PROCEDURE — 95251 CONT GLUC MNTR ANALYSIS I&R: CPT

## 2020-01-28 PROCEDURE — 99215 OFFICE O/P EST HI 40 MIN: CPT | Mod: 25

## 2020-01-28 NOTE — PHYSICAL EXAM
[Alert] : alert [No Acute Distress] : no acute distress [Well Nourished] : well nourished [Well Developed] : well developed [Normal Sclera/Conjunctiva] : normal sclera/conjunctiva [EOMI] : extra ocular movement intact [Normal Oropharynx] : the oropharynx was normal [Thyroid Not Enlarged] : the thyroid was not enlarged [Supple] : the neck was supple [No Respiratory Distress] : no respiratory distress [Normal Rate and Effort] : normal respiratory rhythm and effort [No Accessory Muscle Use] : no accessory muscle use [Clear to Auscultation] : lungs were clear to auscultation bilaterally [Normal S1, S2] : normal S1 and S2 [No Edema] : there was no peripheral edema [Regular Rhythm] : with a regular rhythm [Normal Bowel Sounds] : normal bowel sounds [Soft] : abdomen soft [Not Tender] : non-tender [Not Distended] : not distended [Normal Gait] : normal gait [No Stigmata of Cushings Syndrome] : no stigmata of cushings syndrome [No Clubbing, Cyanosis] : no clubbing  or cyanosis of the fingernails [Normal Strength/Tone] : muscle strength and tone were normal [No Tremors] : no tremors [Normal Affect] : the affect was normal [Oriented x3] : oriented to person, place, and time [Normal Mood] : the mood was normal [Kyphosis] : no kyphosis present [Acanthosis Nigricans] : no acanthosis nigricans [de-identified] : +tachycardic [de-identified] : 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

## 2020-01-28 NOTE — HISTORY OF PRESENT ILLNESS
[FreeTextEntry1] : Magdaleno is a 25 y/o male with type 1 diabetes diagnosed in 2010 with symptoms of polydipsia found to have hyperglycemia on labs. No known complications. Sent to [facility]. Discharged on Lantus and Novolog. Never in DKA. Was on the Medtronic pump since 2010 now on T-Slim since 12/2018 with Novolog. Also using basal IQ.  \par \par Pump Settings: \par Basal Rate: \par Start Time: 12:00 AM ----- 2.0 units/hour \par Start Time: 630A -----  1.8 units/hour \par Start Time: 9A -----  1.9 units/hour \par Start Time: 1P ----- 2.1 units/hour \par Start Time: 1030P -----  2.1 units/hour \par   \par Insulin to Carb Ration (I:C): \par Start Time: 12:00 AM ----- 9 units/hour \par Start Time: -----  units/hour \par Start Time: 9A -----  3 units/hour \par Start Time: 130PM -----  3.5 units/hour      \par Insulin Sensitivity Factor = 12 A 50, 630A 30, 10 P 50 \par BG Target = 140 \par Insulin on Board (IOB) Duration = 4 hours \par Auto off set = 18 hours \par Maximum Basal Rate = units/hour \par Maximum Bolus = 20 units \par \par \par He used to follow with Dr. Galen Lawson last seen August 2014. Seen initially by me 12/14/15 with A1c of >14%. due to fear of hypoglycemia. He was not giving himself full bolus doses and basal rates. Checks glucose 2-3x/day with values 200-400 on average. Knows how carb count. Uses bolus wizard. Overrides frequently. Now giving full bolus at breakfast and trying to give at least 50% of bolus at snacks and lunch and dinner for fear of going low. Now feels more comfortable going to bed with glucose values in the 100's as he has the sensor. Tries to monitor carbs. No soda. +juice if thinks he will go low. Has a snack at bedtime for fear of hypoglycemia. The pump insertion site is changed every 3 days. Site of injection/insertion: abdomen. No polyuria or polydipsia. Denies nausea or vomiting. The patient has glucagon at home, but has no history of hypoglycemic seizure. Does not have medical alert bracelet. Had a baby boy 5/2018. \par  \par +Celiac screen, has not yet seen GI. Cut out cake and some carbs from diet with improvement in abdominal symptoms.

## 2020-01-28 NOTE — DATA REVIEWED
[FreeTextEntry1] : DEXCOM downloaded and reviewed personally 1/28/20: Hyperglycemia after dinner likely under bolusing and high throughout the night with variability during the day. Rare hypoglycemia. \par \par A1c 1/28/20:\par A1c 9.9%\par \par Labs 8/6/19:\par A1c 8.8%\par \par DEXCOM downloaded and reviewed personally 8/6/19: Hyperglycemia less compared to previous downloads. Most of which are overnight and after dinner. No hypoglycemia. \par \par Labs 5/6/19:\par A1c 9.4%\par \par DEXCOM downloaded and reviewed personally 1/28/19: Hyperglycemia persistently, but improving without hypoglycemia.\par \par Labs 1/28/19:\par A1c 9.3%\par Glucose 189\par \par Labs 12/31/18:\par A1c 10.4%\par Glucose 325\par CMP normal\par Chol 235\par HDL 62\par \par Trig 97\par Vit D 25-OH 27\par \par DEXCOM downloaded and reviewed personally 10/29/18: Hyperglycemia without hypoglycemia.\par \par Labs 10/29/18:\par A1c 10.4%\par Glucose 242\par \par DEXCOM download: Hyperglycemia mostly after dinner. No hypoglycemia. \par \par Labs 6/19/18:\par A1c 11.2%\par Glucose 279\par Micro/cr 21\par CMP normal except glucose of 282\par \par HDL 59\par Chol 228\par Trig 144\par Vit D 27.7\par TSH 2.23\par Free T4 1.4\par TPO Ab neg.\par Celiac screen +\par \par Labs 2/7/18:\par Glucose 200\par A1c 13.7%\par \par Sensor Download 5/23/17: Glucose values 200-400 consistently\par \par Labs 5/23/17:\par A1c 12.7%\par Glucose 278\par TSH 2.26\par Free T4 1.4\par Glucose 353\par \par HDL 51\par Chol 247\par Trig 185\par Micro/Cr 19\par Vit D 23.8\par Celiac neg.\par TPO Ab neg. \par \par Labs 5/2016:\par A1c 10.1%\par Glucose 220\par \par Labs 2/22/15:\par A1c 11%\par Glucose 142\par \par Reviewed pump download 2/22/16: Most hyperglycemia in afternoon and evening- related to postprandial excursions. \par \par Labs 12/2015:\par A1c >14%\par Celiac negative\par Micro/Cr 9\par \par HDL 58\par Chol 237\par Trig 140\par CMP normal except glucose of 247\par TSH 2.14\par Free T4 1.3\par \par Reviewed pump down load with persistent hyperglycemia glucose values 200-400 without hypoglycemia.

## 2020-01-28 NOTE — ASSESSMENT
[Carbohydrate Consistent Diet] : carbohydrate consistent diet [Hypoglycemia Management] : hypoglycemia management [Diabetes Foot Care] : diabetes foot care [Long Term Vascular Complications] : long term vascular complications of diabetes [Importance of Diet and Exercise] : importance of diet and exercise to improve glycemic control, achieve weight loss and improve cardiovascular health [Self Monitoring of Blood Glucose] : self monitoring of blood glucose [Retinopathy Screening] : Patient was referred to ophthalmology for retinopathy screening [FreeTextEntry1] : 23 y/o M w/ uncontrolled Type 1 DM on insulin pump with A1c 9.9% (high risk patient with high level decision-making) Discussed the importance of improved glycemic control to prevent long term complications from diabetes. Goal A1c <7%. A1c today 8.8% now improving slowly with improved adherence of carb counting for fear of hypoglycemia. Recommend decrease in insulin doses to allow slow titration where he feels comfortable bolusing recommended amount as he does not bolus full amounts anyway.  Basal rate as indicated:\par \par Basal Rate: \par Start Time: 12:00 AM ----- 2.0 units/hour \par Start Time: 630A -----  1.8 units/hour \par Start Time: 9A -----  Increase to 2.0 units/hour \par Start Time: 1P ----- 2.1 units/hour \par Start Time: 1030P -----  2.1 units/hour \par   \par Insulin to Carb Ration (I:C): \par Start Time: 12:00 AM ----- 9 units/hour \par Start Time: -----  units/hour \par Start Time: 9A -----  3 units/hour \par Start Time: 130PM -----  3.5 units/hour      \par Insulin Sensitivity Factor = 12 A 50, 630A 30, 10 P 50 \par BG Target = 140 \par Insulin on Board (IOB) Duration = 4 hours \par Auto off set = 18 hours \par Maximum Basal Rate = units/hour \par Maximum Bolus = 20 units \par \par \par Dietary and lifestyle modifications discussed. Goal glucose values reviewed. Hypoglycemia management discussed. BP at goal. micro/cr at goal and negative 11/2017. Repeat labs 8/2020. Has glucagon and ketone strips. Medical alert bracelet recommended. Optho follow-up for retinopathy screening. \par GI follow-up for celiac + screen\par

## 2020-03-23 ENCOUNTER — RX RENEWAL (OUTPATIENT)
Age: 25
End: 2020-03-23

## 2020-05-05 ENCOUNTER — APPOINTMENT (OUTPATIENT)
Dept: ENDOCRINOLOGY | Facility: CLINIC | Age: 25
End: 2020-05-05
Payer: MEDICAID

## 2020-05-05 PROCEDURE — 99215 OFFICE O/P EST HI 40 MIN: CPT | Mod: 95

## 2020-05-05 PROCEDURE — 99215 OFFICE O/P EST HI 40 MIN: CPT

## 2020-05-05 NOTE — REVIEW OF SYSTEMS
[All other systems negative] : All other systems negative [Fatigue] : no fatigue [Recent Weight Gain (___ Lbs)] : no recent weight gain [Recent Weight Loss (___ Lbs)] : no recent weight loss [Blurry Vision] : no blurred vision [Dysphagia] : no dysphagia [Chest Pain] : no chest pain [Dysphonia] : no dysphonia [Shortness Of Breath] : no shortness of breath [Nausea] : no nausea [Vomiting] : no vomiting was observed [Cold Intolerance] : cold tolerant [Heat Intolerance] : heat tolerant

## 2020-05-05 NOTE — ASSESSMENT
[Carbohydrate Consistent Diet] : carbohydrate consistent diet [Hypoglycemia Management] : hypoglycemia management [Diabetes Foot Care] : diabetes foot care [Long Term Vascular Complications] : long term vascular complications of diabetes [Self Monitoring of Blood Glucose] : self monitoring of blood glucose [Importance of Diet and Exercise] : importance of diet and exercise to improve glycemic control, achieve weight loss and improve cardiovascular health [Retinopathy Screening] : Patient was referred to ophthalmology for retinopathy screening [FreeTextEntry1] : 26 y/o M w/ uncontrolled Type 1 DM on insulin pump with A1c 8.8% slowly improving with Tadem control IQ. Discussed the importance of improved glycemic control to prevent long term complications from diabetes. Goal A1c <7%.Also with improved adherence of carb counting for fear of hypoglycemia. Recommend decrease in insulin doses to allow slow titration where he feels comfortable bolusing recommended amount as he does not bolus full amounts anyway.  Basal rate as indicated:\par \par Basal Rate: \par Start Time: 12:00 AM ----- decrease to 1.6 units/hour \par Start Time: 630A -----  1.8 units/hour \par Start Time: 9A -----  2.0 units/hour \par Start Time: 1P ----- 2.1 units/hour \par Start Time: 1030P -----  2.1 units/hour \par   \par Insulin to Carb Ration (I:C): \par Start Time: 12:00 AM ----- 9 units/hour \par Start Time: -----  units/hour \par Start Time: 9A -----  3 units/hour \par Start Time: 130PM -----  3.5 units/hour      \par Insulin Sensitivity Factor = 12 A 50, 630A 30, 10 P 50 \par BG Target = 140 \par Insulin on Board (IOB) Duration = 4 hours \par Auto off set = 18 hours \par Maximum Basal Rate = units/hour \par Maximum Bolus = 20 units \par \par \par Dietary and lifestyle modifications discussed. Goal glucose values reviewed. Hypoglycemia management discussed. BP at goal. micro/cr at goal and negative 11/2017. Repeat labs 8/2020. Has glucagon and ketone strips. Medical alert bracelet recommended. Optho follow-up for retinopathy screening. \par GI follow-up for celiac + screen\par

## 2020-05-05 NOTE — HISTORY OF PRESENT ILLNESS
[Other Location: e.g. Home (Enter Location, City,State)___] : at [unfilled] [Home] : at home, [unfilled] , at the time of the visit. [Patient] : the patient [Self] : self [FreeTextEntry2] : Magdaleno Peterson [FreeTextEntry1] : Magdaleno is a 24 y/o male with type 1 diabetes diagnosed in 2010 with symptoms of polydipsia found to have hyperglycemia on labs. No known complications. Sent to [facility]. Discharged on Lantus and Novolog. Never in DKA. Was on the Medtronic pump since 2010 now on T-Slim since 12/2018 with Novolog. Also using basal IQ.  \par \par Pump Settings: \par Basal Rate: \par Start Time: 12:00 AM ----- 2.0 units/hour \par Start Time: 630A -----  1.8 units/hour \par Start Time: 9A -----  2.0 units/hour \par Start Time: 1P ----- 2.1 units/hour \par Start Time: 1000P -----  2.1 units/hour \par  \par Insulin to Carb Ration (I:C): \par Start Time: 12:00 AM ----- 9 units/hour \par Start Time: -----  units/hour \par Start Time: 9A -----  3 units/hour \par Start Time: 130PM -----  3.5 units/hour      \par Insulin Sensitivity Factor = 12 A 50, 630A 30, 10 P 50 \par BG Target = 140 \par Insulin on Board (IOB) Duration = 4 hours \par Auto off set = 18 hours \par Maximum Basal Rate = units/hour \par Maximum Bolus = 20 units \par \par \par He used to follow with Dr. Galen Lawson last seen August 2014. Seen initially by me 12/14/15 with A1c of >14%. due to fear of hypoglycemia. He was not giving himself full bolus doses and basal rates. Now switched to Tandem with control IQ with improvement in glycemic control and less fear of hypoglycemia. Knows how carb count. Uses bolus wizard. Now giving full bolus at breakfast and trying to give at least 80% of bolus at snacks and lunch and dinner for fear of going low. Now feels more comfortable going to bed with glucose values in the 100's as he has the sensor, but still wont go to sleep unless glucose is >200. Tries to monitor carbs. No soda. +juice if thinks he will go low. Has a snack at bedtime for fear of hypoglycemia. The pump insertion site is changed every 3 days. Site of injection/insertion: abdomen. No polyuria or polydipsia. Denies nausea or vomiting. The patient has glucagon at home, but has no history of hypoglycemic seizure. Does not have medical alert bracelet. Had a baby boy 5/2018. \par  \par +Celiac screen, has not yet seen GI. Cut out cake and some carbs from diet with improvement in abdominal symptoms.

## 2020-05-05 NOTE — PHYSICAL EXAM
[Alert] : alert [Well Nourished] : well nourished [Healthy Appearance] : healthy appearance [Normal Voice/Communication] : normal voice communication [Oriented x3] : oriented to person, place, and time [Normal Affect] : the affect was normal [Normal Mood] : the mood was normal

## 2020-05-05 NOTE — DATA REVIEWED
[FreeTextEntry1] : DEXCOM downloaded and reviewed personally 5/6/20: Hyperglycemia after dinner likely under bolusing and eating more to get to higher glucose for bedtime. Large drop overnight with variability during the day. Rare hypoglycemia. \par \par DEXCOM downloaded and reviewed personally 1/28/20: Hyperglycemia after dinner likely under bolusing and high throughout the night with variability during the day. Rare hypoglycemia. \par \par A1c 1/28/20:\par A1c 9.9%\par \par Labs 8/6/19:\par A1c 8.8%\par \par DEXCOM downloaded and reviewed personally 8/6/19: Hyperglycemia less compared to previous downloads. Most of which are overnight and after dinner. No hypoglycemia. \par \par Labs 5/6/19:\par A1c 9.4%\par \par DEXCOM downloaded and reviewed personally 1/28/19: Hyperglycemia persistently, but improving without hypoglycemia.\par \par Labs 1/28/19:\par A1c 9.3%\par Glucose 189\par \par Labs 12/31/18:\par A1c 10.4%\par Glucose 325\par CMP normal\par Chol 235\par HDL 62\par \par Trig 97\par Vit D 25-OH 27\par \par DEXCOM downloaded and reviewed personally 10/29/18: Hyperglycemia without hypoglycemia.\par \par Labs 10/29/18:\par A1c 10.4%\par Glucose 242\par \par DEXCOM download: Hyperglycemia mostly after dinner. No hypoglycemia. \par \par Labs 6/19/18:\par A1c 11.2%\par Glucose 279\par Micro/cr 21\par CMP normal except glucose of 282\par \par HDL 59\par Chol 228\par Trig 144\par Vit D 27.7\par TSH 2.23\par Free T4 1.4\par TPO Ab neg.\par Celiac screen +\par \par Labs 2/7/18:\par Glucose 200\par A1c 13.7%\par \par Sensor Download 5/23/17: Glucose values 200-400 consistently\par \par Labs 5/23/17:\par A1c 12.7%\par Glucose 278\par TSH 2.26\par Free T4 1.4\par Glucose 353\par \par HDL 51\par Chol 247\par Trig 185\par Micro/Cr 19\par Vit D 23.8\par Celiac neg.\par TPO Ab neg. \par \par Labs 5/2016:\par A1c 10.1%\par Glucose 220\par \par Labs 2/22/15:\par A1c 11%\par Glucose 142\par \par Reviewed pump download 2/22/16: Most hyperglycemia in afternoon and evening- related to postprandial excursions. \par \par Labs 12/2015:\par A1c >14%\par Celiac negative\par Micro/Cr 9\par \par HDL 58\par Chol 237\par Trig 140\par CMP normal except glucose of 247\par TSH 2.14\par Free T4 1.3\par \par Reviewed pump down load with persistent hyperglycemia glucose values 200-400 without hypoglycemia.

## 2020-09-21 ENCOUNTER — APPOINTMENT (OUTPATIENT)
Dept: ENDOCRINOLOGY | Facility: CLINIC | Age: 25
End: 2020-09-21
Payer: MEDICAID

## 2020-09-21 VITALS
HEIGHT: 67 IN | DIASTOLIC BLOOD PRESSURE: 93 MMHG | OXYGEN SATURATION: 99 % | WEIGHT: 217 LBS | HEART RATE: 123 BPM | SYSTOLIC BLOOD PRESSURE: 134 MMHG | BODY MASS INDEX: 34.06 KG/M2 | TEMPERATURE: 98.2 F

## 2020-09-21 LAB — HBA1C MFR BLD HPLC: 7.9

## 2020-09-21 PROCEDURE — 95251 CONT GLUC MNTR ANALYSIS I&R: CPT

## 2020-09-21 PROCEDURE — 83036 HEMOGLOBIN GLYCOSYLATED A1C: CPT | Mod: QW

## 2020-09-21 PROCEDURE — 99214 OFFICE O/P EST MOD 30 MIN: CPT | Mod: 25

## 2020-10-02 ENCOUNTER — RX RENEWAL (OUTPATIENT)
Age: 25
End: 2020-10-02

## 2020-10-07 ENCOUNTER — RX RENEWAL (OUTPATIENT)
Age: 25
End: 2020-10-07

## 2020-10-07 RX ORDER — INSULIN LISPRO 100 [IU]/ML
100 INJECTION, SOLUTION INTRAVENOUS; SUBCUTANEOUS
Qty: 4 | Refills: 5 | Status: ACTIVE | COMMUNITY
Start: 2020-10-02 | End: 1900-01-01

## 2020-10-21 LAB
25(OH)D3 SERPL-MCNC: 22.5 NG/ML
ALBUMIN SERPL ELPH-MCNC: 4.7 G/DL
ALP BLD-CCNC: 105 U/L
ALT SERPL-CCNC: 114 U/L
ANION GAP SERPL CALC-SCNC: 13 MMOL/L
AST SERPL-CCNC: 66 U/L
BASOPHILS # BLD AUTO: 0.06 K/UL
BASOPHILS NFR BLD AUTO: 0.4 %
BILIRUB SERPL-MCNC: 0.5 MG/DL
BUN SERPL-MCNC: 12 MG/DL
CALCIUM SERPL-MCNC: 9.9 MG/DL
CHLORIDE SERPL-SCNC: 99 MMOL/L
CHOLEST SERPL-MCNC: 237 MG/DL
CHOLEST/HDLC SERPL: 5.1 RATIO
CO2 SERPL-SCNC: 27 MMOL/L
CREAT SERPL-MCNC: 0.67 MG/DL
CREAT SPEC-SCNC: 149 MG/DL
EOSINOPHIL # BLD AUTO: 0.03 K/UL
EOSINOPHIL NFR BLD AUTO: 0.2 %
GLUCOSE SERPL-MCNC: 211 MG/DL
HCT VFR BLD CALC: 51.3 %
HDLC SERPL-MCNC: 47 MG/DL
HGB BLD-MCNC: 16.5 G/DL
IMM GRANULOCYTES NFR BLD AUTO: 0.7 %
LDLC SERPL CALC-MCNC: 127 MG/DL
LYMPHOCYTES # BLD AUTO: 2.24 K/UL
LYMPHOCYTES NFR BLD AUTO: 16.3 %
MAN DIFF?: NORMAL
MCHC RBC-ENTMCNC: 28.6 PG
MCHC RBC-ENTMCNC: 32.2 GM/DL
MCV RBC AUTO: 89.1 FL
MICROALBUMIN 24H UR DL<=1MG/L-MCNC: 2 MG/DL
MICROALBUMIN/CREAT 24H UR-RTO: 13 MG/G
MONOCYTES # BLD AUTO: 0.73 K/UL
MONOCYTES NFR BLD AUTO: 5.3 %
NEUTROPHILS # BLD AUTO: 10.61 K/UL
NEUTROPHILS NFR BLD AUTO: 77.1 %
PLATELET # BLD AUTO: 256 K/UL
POTASSIUM SERPL-SCNC: 4.4 MMOL/L
PROT SERPL-MCNC: 7.2 G/DL
RBC # BLD: 5.76 M/UL
RBC # FLD: 12.3 %
SODIUM SERPL-SCNC: 139 MMOL/L
T4 FREE SERPL-MCNC: 1.3 NG/DL
THYROGLOB AB SERPL-ACNC: <20 IU/ML
THYROPEROXIDASE AB SERPL IA-ACNC: <10 IU/ML
TRIGL SERPL-MCNC: 312 MG/DL
TSH SERPL-ACNC: 1.87 UIU/ML
TTG IGA SER IA-ACNC: <1.2 U/ML
TTG IGA SER-ACNC: NEGATIVE
TTG IGG SER IA-ACNC: 1.6 U/ML
TTG IGG SER IA-ACNC: NEGATIVE
WBC # FLD AUTO: 13.77 K/UL

## 2020-11-16 NOTE — PHYSICAL EXAM
[Alert] : alert [Well Nourished] : well nourished [Healthy Appearance] : healthy appearance [Normal Voice/Communication] : normal voice communication [Oriented x3] : oriented to person, place, and time [Normal Affect] : the affect was normal [Normal Mood] : the mood was normal [No Proptosis] : no proptosis [Thyroid Not Enlarged] : the thyroid was not enlarged [No Thyroid Nodules] : no palpable thyroid nodules [No Respiratory Distress] : no respiratory distress [No Accessory Muscle Use] : no accessory muscle use [Normal Rate and Effort] : normal respiratory rate and effort [Clear to Auscultation] : lungs were clear to auscultation bilaterally [Normal S1, S2] : normal S1 and S2 [Normal Rate] : heart rate was normal [Regular Rhythm] : with a regular rhythm [No Edema] : no peripheral edema [Normal Bowel Sounds] : normal bowel sounds [Not Tender] : non-tender [Not Distended] : not distended [Soft] : abdomen soft [No Stigmata of Cushings Syndrome] : no stigmata of Cushings Syndrome [No Clubbing, Cyanosis] : no clubbing  or cyanosis of the fingernails [Normal Strength/Tone] : muscle strength and tone were normal [No Motor Deficits] : the motor exam was normal [No Tremors] : no tremors [Kyphosis] : no kyphosis present

## 2020-11-16 NOTE — REVIEW OF SYSTEMS
[Depression] : depression [Anxiety] : anxiety [All other systems negative] : All other systems negative [Fatigue] : no fatigue [Recent Weight Gain (___ Lbs)] : no recent weight gain [Recent Weight Loss (___ Lbs)] : no recent weight loss [Visual Field Defect] : no visual field defect [Dysphagia] : no dysphagia [Dysphonia] : no dysphonia [Chest Pain] : no chest pain [Palpitations] : no palpitations [Shortness Of Breath] : no shortness of breath [Nausea] : no nausea [Vomiting] : no vomiting [Polyuria] : no polyuria [Joint Pain] : no joint pain [Headaches] : no headaches [Tremors] : no tremors [Polydipsia] : no polydipsia [Cold Intolerance] : no cold intolerance [Heat Intolerance] : no heat intolerance

## 2020-11-16 NOTE — ASSESSMENT
[Carbohydrate Consistent Diet] : carbohydrate consistent diet [Hypoglycemia Management] : hypoglycemia management [Diabetes Foot Care] : diabetes foot care [Long Term Vascular Complications] : long term vascular complications of diabetes [Importance of Diet and Exercise] : importance of diet and exercise to improve glycemic control, achieve weight loss and improve cardiovascular health [Self Monitoring of Blood Glucose] : self monitoring of blood glucose [Retinopathy Screening] : Patient was referred to ophthalmology for retinopathy screening [FreeTextEntry1] : 24 y/o M w/ uncontrolled Type 1 DM on insulin pump with A1c 8.8% slowly improving with Tadem control IQ. Discussed the importance of improved glycemic control to prevent long term complications from diabetes. Goal A1c <7%.Also with improved adherence of carb counting for fear of hypoglycemia. Basal rate as indicated:\par \par Basal Rate: \par Start Time: 12:00 AM -----1.6 units/hour \par Start Time: 630A -----  1.8 units/hour \par Start Time: 9A -----  2.0 units/hour \par Start Time: 1P ----- 2.1 units/hour \par Start Time: 1030P -----  2.1 units/hour \par   \par Insulin to Carb Ration (I:C): \par Start Time: 12:00 AM ----- 9 \par Start Time: -----  units/hour \par Start Time: 9A -----  3 units/hour \par Start Time: 130PM -----  3.5 units/hour      \par Insulin Sensitivity Factor = 12 A 50, 630A 30, 10 P 50 \par BG Target = 140 \par Insulin on Board (IOB) Duration = 4 hours \par Auto off set = 18 hours \par Maximum Basal Rate = units/hour \par Maximum Bolus = 20 units \par \par \par Dietary and lifestyle modifications discussed. Goal glucose values reviewed. Hypoglycemia management discussed. BP at goal. micro/cr at goal and negative 11/2017. Repeat labs today. Has glucagon and ketone strips. Medical alert bracelet recommended. Optho follow-up for retinopathy screening. \par GI follow-up for celiac + screen\par

## 2020-11-16 NOTE — HISTORY OF PRESENT ILLNESS
[FreeTextEntry1] : Magdaleno is a 26 y/o male with type 1 diabetes diagnosed in 2010 with symptoms of polydipsia found to have hyperglycemia on labs. No known complications. Sent to [facility]. Discharged on Lantus and Novolog. Never in DKA. Was on the Medtronic pump since 2010 now on T-Slim since 12/2018 with Novolog. Also using control IQ.  \par \par Pump Settings: \par Basal Rate: \par Start Time: 12:00 AM ----- 1.6 units/hour \par Start Time: 630A -----  1.8 units/hour \par Start Time: 9A -----  2.0 units/hour \par Start Time: 1P ----- 2.1 units/hour \par Start Time: 1000P -----  2.1 units/hour \par  \par Insulin to Carb Ration (I:C): \par Start Time: 12:00 AM ----- 9 units/hour \par Start Time: -----  units/hour \par Start Time: 9A -----  3 units/hour \par Start Time: 130PM -----  3.5 units/hour      \par Insulin Sensitivity Factor = 12 A 50, 630A 30, 10 P 50 \par BG Target = 140 \par Insulin on Board (IOB) Duration = 4 hours \par Auto off set = 18 hours \par Maximum Basal Rate = units/hour \par Maximum Bolus = 20 units \par \par \par He used to follow with Dr. Galen Lawson last seen August 2014. Seen initially by me 12/14/15 with A1c of >14%. due to fear of hypoglycemia. He was not giving himself full bolus doses and basal rates. Now switched to Tandem with control IQ with improvement in glycemic control and less fear of hypoglycemia. Knows how carb count. Uses bolus wizard. Now giving full bolus at breakfast and trying to give at least 80% of bolus at snacks and lunch and dinner for fear of going low. Now feels more comfortable going to bed with glucose values in the 100's as he has the sensor, but still wont go to sleep unless glucose is >200. Tries to monitor carbs. No soda. +juice if thinks he will go low. Has a snack at bedtime for fear of hypoglycemia. The pump insertion site is changed every 2 days. Site of injection/insertion: abdomen. No polyuria or polydipsia. Denies nausea or vomiting. The patient has glucagon at home, but has no history of hypoglycemic seizure. Does not have medical alert bracelet. Had a baby boy 5/2018. \par  \par +Celiac screen, has not yet seen GI. Cut out cake and some carbs from diet with improvement in abdominal symptoms.

## 2020-11-16 NOTE — DATA REVIEWED
[FreeTextEntry1] : DEXCOM downloaded and reviewed personally 9/21/20: Hyperglycemia after dinner likely under bolusing and eating more to get to higher glucose for bedtime. Large drop overnight with variability during the day. Rare hypoglycemia. \par \par DEXCOM downloaded and reviewed personally 5/6/20: Hyperglycemia after dinner likely under bolusing and eating more to get to higher glucose for bedtime. Large drop overnight with variability during the day. Rare hypoglycemia. \par \par DEXCOM downloaded and reviewed personally 1/28/20: Hyperglycemia after dinner likely under bolusing and high throughout the night with variability during the day. Rare hypoglycemia. \par \par A1c 1/28/20:\par A1c 9.9%\par \par Labs 8/6/19:\par A1c 8.8%\par \par DEXCOM downloaded and reviewed personally 8/6/19: Hyperglycemia less compared to previous downloads. Most of which are overnight and after dinner. No hypoglycemia. \par \par Labs 5/6/19:\par A1c 9.4%\par \par DEXCOM downloaded and reviewed personally 1/28/19: Hyperglycemia persistently, but improving without hypoglycemia.\par \par Labs 1/28/19:\par A1c 9.3%\par Glucose 189\par \par Labs 12/31/18:\par A1c 10.4%\par Glucose 325\par CMP normal\par Chol 235\par HDL 62\par \par Trig 97\par Vit D 25-OH 27\par \par DEXCOM downloaded and reviewed personally 10/29/18: Hyperglycemia without hypoglycemia.\par \par Labs 10/29/18:\par A1c 10.4%\par Glucose 242\par \par DEXCOM download: Hyperglycemia mostly after dinner. No hypoglycemia. \par \par Labs 6/19/18:\par A1c 11.2%\par Glucose 279\par Micro/cr 21\par CMP normal except glucose of 282\par \par HDL 59\par Chol 228\par Trig 144\par Vit D 27.7\par TSH 2.23\par Free T4 1.4\par TPO Ab neg.\par Celiac screen +\par \par Labs 2/7/18:\par Glucose 200\par A1c 13.7%\par \par Sensor Download 5/23/17: Glucose values 200-400 consistently\par \par Labs 5/23/17:\par A1c 12.7%\par Glucose 278\par TSH 2.26\par Free T4 1.4\par Glucose 353\par \par HDL 51\par Chol 247\par Trig 185\par Micro/Cr 19\par Vit D 23.8\par Celiac neg.\par TPO Ab neg. \par \par Labs 5/2016:\par A1c 10.1%\par Glucose 220\par \par Labs 2/22/15:\par A1c 11%\par Glucose 142\par \par Reviewed pump download 2/22/16: Most hyperglycemia in afternoon and evening- related to postprandial excursions. \par \par Labs 12/2015:\par A1c >14%\par Celiac negative\par Micro/Cr 9\par \par HDL 58\par Chol 237\par Trig 140\par CMP normal except glucose of 247\par TSH 2.14\par Free T4 1.3\par \par Reviewed pump down load with persistent hyperglycemia glucose values 200-400 without hypoglycemia.

## 2020-12-22 ENCOUNTER — APPOINTMENT (OUTPATIENT)
Dept: ENDOCRINOLOGY | Facility: CLINIC | Age: 25
End: 2020-12-22
Payer: MEDICAID

## 2020-12-22 PROCEDURE — 95251 CONT GLUC MNTR ANALYSIS I&R: CPT

## 2020-12-22 PROCEDURE — 99443: CPT

## 2020-12-22 NOTE — PHYSICAL EXAM
[Alert] : alert [Well Nourished] : well nourished [Healthy Appearance] : healthy appearance [Normal Voice/Communication] : normal voice communication [No Proptosis] : no proptosis [Thyroid Not Enlarged] : the thyroid was not enlarged [No Thyroid Nodules] : no palpable thyroid nodules [No Respiratory Distress] : no respiratory distress [No Accessory Muscle Use] : no accessory muscle use [Normal Rate and Effort] : normal respiratory rate and effort [Clear to Auscultation] : lungs were clear to auscultation bilaterally [Normal S1, S2] : normal S1 and S2 [Normal Rate] : heart rate was normal [Regular Rhythm] : with a regular rhythm [No Edema] : no peripheral edema [Normal Bowel Sounds] : normal bowel sounds [Not Tender] : non-tender [Not Distended] : not distended [Soft] : abdomen soft [No Stigmata of Cushings Syndrome] : no stigmata of Cushings Syndrome [No Clubbing, Cyanosis] : no clubbing  or cyanosis of the fingernails [Normal Strength/Tone] : muscle strength and tone were normal [No Motor Deficits] : the motor exam was normal [No Tremors] : no tremors [Oriented x3] : oriented to person, place, and time [Normal Affect] : the affect was normal [Normal Mood] : the mood was normal [Kyphosis] : no kyphosis present

## 2020-12-23 NOTE — HISTORY OF PRESENT ILLNESS
[Home] : at home, [unfilled] , at the time of the visit. [Medical Office: ([facility])___] : at the medical office located in  [Verbal consent obtained from patient] : the patient, [unfilled] [FreeTextEntry1] : Magdaleno is a 24 y/o male with type 1 diabetes diagnosed in 2010 with symptoms of polydipsia found to have hyperglycemia on labs. No known complications. Sent to [facility]. Discharged on Lantus and Novolog. Never in DKA. Was on the Medtronic pump since 2010 now on T-Slim since 12/2018 with Novolog. Also using control IQ.  \par \par Pump Settings: \par Basal Rate: \par Start Time: 12:00 AM ----- 1.6 units/hour \par Start Time: 630A -----  1.8 units/hour \par Start Time: 9A -----  2.0 units/hour \par Start Time: 1P ----- 2.1 units/hour \par Start Time: 1000P -----  2.1 units/hour \par  \par Insulin to Carb Ration (I:C): \par Start Time: 12:00 AM ----- 9 units/hour \par Start Time: -----  units/hour \par Start Time: 9A -----  3 units/hour \par Start Time: 130PM -----  3.5 units/hour      \par Insulin Sensitivity Factor = 12 A 50, 630A 30, 10 P 50 \par BG Target = 140 \par Insulin on Board (IOB) Duration = 4 hours \par Auto off set = 18 hours \par Maximum Basal Rate = units/hour \par Maximum Bolus = 20 units \par \par \par He used to follow with Dr. Galen Lawson last seen August 2014. Seen initially by me 12/14/15 with A1c of >14%. due to fear of hypoglycemia. He was not giving himself full bolus doses and basal rates. Now switched to Tandem with control IQ with improvement in glycemic control and less fear of hypoglycemia. Knows how carb count. Uses bolus wizard. Now giving full bolus at breakfast and trying to give at least 80% of bolus at snacks and lunch and dinner for fear of going low. Now feels more comfortable going to bed with glucose values in the 100's as he has the sensor, but still wont go to sleep unless glucose is >200. Will typically eat something around 10pm to avoid going to bed with lower glucose. Usually does not give insulin for these snacks.Tries to monitor carbs. No soda. +juice if thinks he will go low. Has a snack at bedtime for fear of hypoglycemia. The pump insertion site is changed every 2 days. Site of injection/insertion: abdomen. No polyuria or polydipsia. Denies nausea or vomiting. The patient has glucagon at home, but has no history of hypoglycemic seizure. Does not have medical alert bracelet. Had a baby boy 5/2018. \par  \par +Celiac screen, has not yet seen GI. Cut out cake and some carbs from diet with improvement in abdominal symptoms.

## 2020-12-23 NOTE — DATA REVIEWED
[FreeTextEntry1] : DEXCOM downloaded and reviewed personally 12/22/20: Hyperglycemia at bedtime likely due to snacking around 10pm without insulin bolus for food. Dropping a lot overnight usually by around 200. Rare hypoglycemia. \par \par Labs 9/2020:\par A1c 7.9%\par CMP normal except glucose of 211\par AST 66\par \par \par Chol 237\par HDL 47\par \par TSH 1.87\par TPO Ab neg.\par Celiac neg.\par Micro/Cr 13\par Free T4 1.3\par Vit D 22.5\par \par DEXCOM downloaded and reviewed personally 9/21/20: Hyperglycemia after dinner likely under bolusing and eating more to get to higher glucose for bedtime. Large drop overnight with variability during the day. Rare hypoglycemia. \par \par DEXCOM downloaded and reviewed personally 5/6/20: Hyperglycemia after dinner likely under bolusing and eating more to get to higher glucose for bedtime. Large drop overnight with variability during the day. Rare hypoglycemia. \par \par DEXCOM downloaded and reviewed personally 1/28/20: Hyperglycemia after dinner likely under bolusing and high throughout the night with variability during the day. Rare hypoglycemia. \par \par A1c 1/28/20:\par A1c 9.9%\par \par Labs 8/6/19:\par A1c 8.8%\par \par DEXCOM downloaded and reviewed personally 8/6/19: Hyperglycemia less compared to previous downloads. Most of which are overnight and after dinner. No hypoglycemia. \par \par Labs 5/6/19:\par A1c 9.4%\par \par DEXCOM downloaded and reviewed personally 1/28/19: Hyperglycemia persistently, but improving without hypoglycemia.\par \par Labs 1/28/19:\par A1c 9.3%\par Glucose 189\par \par Labs 12/31/18:\par A1c 10.4%\par Glucose 325\par CMP normal\par Chol 235\par HDL 62\par \par Trig 97\par Vit D 25-OH 27\par \par DEXCOM downloaded and reviewed personally 10/29/18: Hyperglycemia without hypoglycemia.\par \par Labs 10/29/18:\par A1c 10.4%\par Glucose 242\par \par DEXCOM download: Hyperglycemia mostly after dinner. No hypoglycemia. \par \par Labs 6/19/18:\par A1c 11.2%\par Glucose 279\par Micro/cr 21\par CMP normal except glucose of 282\par \par HDL 59\par Chol 228\par Trig 144\par Vit D 27.7\par TSH 2.23\par Free T4 1.4\par TPO Ab neg.\par Celiac screen +\par \par Labs 2/7/18:\par Glucose 200\par A1c 13.7%\par \par Sensor Download 5/23/17: Glucose values 200-400 consistently\par \par Labs 5/23/17:\par A1c 12.7%\par Glucose 278\par TSH 2.26\par Free T4 1.4\par Glucose 353\par \par HDL 51\par Chol 247\par Trig 185\par Micro/Cr 19\par Vit D 23.8\par Celiac neg.\par TPO Ab neg. \par \par Labs 5/2016:\par A1c 10.1%\par Glucose 220\par \par Labs 2/22/15:\par A1c 11%\par Glucose 142\par \par Reviewed pump download 2/22/16: Most hyperglycemia in afternoon and evening- related to postprandial excursions. \par \par Labs 12/2015:\par A1c >14%\par Celiac negative\par Micro/Cr 9\par \par HDL 58\par Chol 237\par Trig 140\par CMP normal except glucose of 247\par TSH 2.14\par Free T4 1.3\par \par Reviewed pump down load with persistent hyperglycemia glucose values 200-400 without hypoglycemia.

## 2020-12-23 NOTE — ASSESSMENT
[Carbohydrate Consistent Diet] : carbohydrate consistent diet [Hypoglycemia Management] : hypoglycemia management [Diabetes Foot Care] : diabetes foot care [Long Term Vascular Complications] : long term vascular complications of diabetes [Importance of Diet and Exercise] : importance of diet and exercise to improve glycemic control, achieve weight loss and improve cardiovascular health [Self Monitoring of Blood Glucose] : self monitoring of blood glucose [Retinopathy Screening] : Patient was referred to ophthalmology for retinopathy screening [FreeTextEntry1] : 24 y/o M w/ uncontrolled Type 1 DM on insulin pump slowly improving with Tadem control IQ. Discussed the importance of improved glycemic control to prevent long term complications from diabetes. Goal A1c <7%.Also with improved adherence of carb counting for fear of hypoglycemia. Avoid bedtime snacking or, if snacking, give a bolus of insulin. Basal rate as indicated:\par \par Basal Rate: \par Start Time: 12:00 AM -----1.3 units/hour \par Start Time: 630A -----  1.8 units/hour \par Start Time: 9A -----  2.0 units/hour \par Start Time: 1P ----- 2.1 units/hour \par Start Time: 1030P -----  2.1 units/hour \par   \par Insulin to Carb Ration (I:C): \par Start Time: 12:00 AM ----- 9 \par Start Time: -----  units/hour \par Start Time: 9A -----  2.5 units/hour \par Start Time: 130PM -----  3.5 units/hour      \par Insulin Sensitivity Factor = 12 A 50, 630A 30, 10 P 50 \par BG Target = 140 \par Insulin on Board (IOB) Duration = 4 hours \par Auto off set = 18 hours \par Maximum Basal Rate = units/hour \par Maximum Bolus = 20 units \par \par \par Dietary and lifestyle modifications discussed. Goal glucose values reviewed. Hypoglycemia management discussed. BP at goal. micro/cr at goal and negative 9/2020. Has glucagon and ketone strips. Medical alert bracelet recommended. Optho follow-up for retinopathy screening. \par GI follow-up for celiac + screen\par

## 2021-03-02 ENCOUNTER — RX RENEWAL (OUTPATIENT)
Age: 26
End: 2021-03-02

## 2021-03-08 ENCOUNTER — RX RENEWAL (OUTPATIENT)
Age: 26
End: 2021-03-08

## 2021-08-27 ENCOUNTER — RX RENEWAL (OUTPATIENT)
Age: 26
End: 2021-08-27

## 2021-11-17 ENCOUNTER — RESULT CHARGE (OUTPATIENT)
Age: 26
End: 2021-11-17

## 2021-11-17 ENCOUNTER — APPOINTMENT (OUTPATIENT)
Dept: ENDOCRINOLOGY | Facility: CLINIC | Age: 26
End: 2021-11-17
Payer: COMMERCIAL

## 2021-11-17 VITALS
HEART RATE: 111 BPM | SYSTOLIC BLOOD PRESSURE: 148 MMHG | WEIGHT: 217 LBS | BODY MASS INDEX: 34.06 KG/M2 | HEIGHT: 67 IN | TEMPERATURE: 98.2 F | DIASTOLIC BLOOD PRESSURE: 99 MMHG | OXYGEN SATURATION: 98 %

## 2021-11-17 LAB — HBA1C MFR BLD HPLC: 8.1

## 2021-11-17 PROCEDURE — 95251 CONT GLUC MNTR ANALYSIS I&R: CPT

## 2021-11-17 PROCEDURE — 99215 OFFICE O/P EST HI 40 MIN: CPT | Mod: 25

## 2021-11-17 NOTE — HISTORY OF PRESENT ILLNESS
[FreeTextEntry1] : Magdaleno is a 27 y/o male with type 1 diabetes diagnosed in 2010 with symptoms of polydipsia found to have hyperglycemia on labs. No known complications. Sent to [facility]. Discharged on Lantus and Novolog. Never in DKA. Was on the Medtronic pump since 2010 now on T-Slim since 12/2018 with Novolog. Also using control IQ.  \par \par Pump Settings: \par Basal Rate: \par Start Time: 12:00 AM ----- 1.3 units/hour \par Start Time: 630A -----  1.8 units/hour \par Start Time: 9A -----  2.0 units/hour \par Start Time: 1P ----- 2.1 units/hour \par Start Time: 1000P -----  2.1 units/hour \par  \par Insulin to Carb Ration (I:C): \par Start Time: 12:00 AM ----- 9 units/hour \par Start Time: -----  units/hour \par Start Time: 9A -----  2.5 units/hour \par Start Time: 130PM -----  3.5 units/hour   \par 10pm 9    units/hour  \par Insulin Sensitivity Factor = 12 A 50, 630A 30, 10 P 50 \par BG Target = 140 \par Insulin on Board (IOB) Duration = 4 hours \par Auto off set = 18 hours \par Maximum Basal Rate = units/hour \par Maximum Bolus = 20 units \par \par \par He used to follow with Dr. Galen Lawson, transitioned to adult endocrine 2/14/15 with A1c of >14%. due to fear of hypoglycemia. He was not giving himself full bolus doses and basal rates. Now switched to Tandem with control IQ with improvement in glycemic control and less fear of hypoglycemia. Knows how carb count. Uses bolus wizard. Now giving full bolus at breakfast and trying to give at least 80% of bolus at snacks and lunch and dinner for fear of going low. Now feels more comfortable going to bed with glucose values in the 100's as he has the sensor, but still wont go to sleep unless glucose is >200. Will typically eat something around 10pm to avoid going to bed with lower glucose. Usually does not give insulin for these snacks.Tries to monitor carbs. No soda. +juice if thinks he will go low. Has a snack at bedtime for fear of hypoglycemia. The pump insertion site is changed every 2 days. Site of injection/insertion: abdomen. No polyuria or polydipsia. Denies nausea or vomiting. The patient has glucagon at home, but has no history of hypoglycemic seizure. Does not have medical alert bracelet. Had a baby boy 5/2018. \par  \par +Celiac screen, has not yet seen GI. Cut out cake and some carbs from diet with improvement in abdominal symptoms. \par +optho 6/2021

## 2021-11-17 NOTE — REVIEW OF SYSTEMS
[Anxiety] : anxiety [All other systems negative] : All other systems negative [Fatigue] : no fatigue [Recent Weight Gain (___ Lbs)] : no recent weight gain [Recent Weight Loss (___ Lbs)] : no recent weight loss [Visual Field Defect] : no visual field defect [Dysphagia] : no dysphagia [Dysphonia] : no dysphonia [Chest Pain] : no chest pain [Palpitations] : no palpitations [Shortness Of Breath] : no shortness of breath [Nausea] : no nausea [Vomiting] : no vomiting [Polyuria] : no polyuria [Joint Pain] : no joint pain [Headaches] : no headaches [Tremors] : no tremors [Depression] : no depression [Polydipsia] : no polydipsia [Cold Intolerance] : no cold intolerance [Heat Intolerance] : no heat intolerance

## 2021-11-17 NOTE — ASSESSMENT
[Carbohydrate Consistent Diet] : carbohydrate consistent diet [Hypoglycemia Management] : hypoglycemia management [Diabetes Foot Care] : diabetes foot care [Long Term Vascular Complications] : long term vascular complications of diabetes [Importance of Diet and Exercise] : importance of diet and exercise to improve glycemic control, achieve weight loss and improve cardiovascular health [Self Monitoring of Blood Glucose] : self monitoring of blood glucose [Retinopathy Screening] : Patient was referred to ophthalmology for retinopathy screening [FreeTextEntry1] : 25 y/o M w/ uncontrolled Type 1 DM on insulin pump slowly improving with Tadem control IQ. Discussed the importance of improved glycemic control to prevent long term complications from diabetes. Goal A1c <7%.Also with improved adherence of carb counting for fear of hypoglycemia. Avoid bedtime snacking or, if snacking, give a bolus of insulin. Basal rate as indicated:\par \par Basal Rate: \par Start Time: 12:00 AM -----1.3 units/hour \par Start Time: 630A -----  1.8 units/hour \par Start Time: 9A -----  2.0 units/hour \par Start Time: 1P ----- 2.1 units/hour \par Start Time: 1030P -----  2.1 units/hour \par   \par Insulin to Carb Ration (I:C): \par Start Time: 12:00 AM ----- 9 \par Start Time: -----  units/hour \par Start Time: 9A -----  2.5 units/hour \par Start Time: 130PM -----  3.5 units/hour   \par 10pm 9   \par Insulin Sensitivity Factor = 12 A 50, 630A 30, 10 P 50 \par BG Target = 140 \par Insulin on Board (IOB) Duration = 4 hours \par Auto off set = 18 hours \par Maximum Basal Rate = units/hour \par Maximum Bolus = 20 units \par \par \par Dietary and lifestyle modifications discussed. Goal glucose values reviewed. Hypoglycemia management discussed. BP at goal. micro/cr at goal and negative 9/2020. Has glucagon and ketone strips. Medical alert bracelet recommended. Optho follow-up for retinopathy screening. \par GI follow-up for celiac + screen\par \par Prep time with review of labs and interval progress notes and consultations \par Discussion with patient regarding diabetes management plan, risks of hyper and hypoglycemia, treatment options and goals of care answering all patients questions and addressing all concerns \par Insulin pump and Sensor download and review with discussion with patient\par Post-visit completion charting and review \par Total Time 42 min\par \par Specifically causes, evaluation, treatment options, risks, complications, and benefits of available therapies were discussed. Questions were answered.\par \par The submitted E/M billing level for this visit reflects the total time spent on the day of the visit including face-to-face time spent with the patient, non-face-to-face review of medical records and relevant information, documentation, and asynchronous communication with the patient after a visit via phone, email, or patient’s EHR portal after the visit. \par The medical records reviewed are either scanned into the chart or reviewed with the patient using a patient’s electronic medical records portal for patients with records not available to [facility] via electronic transmission platforms from other institutions and labs. \par Time spend counseling and performing coordination of care was also included in determining the appropriate EM billing level.\par \par I have reviewed and verified information regarding the chief complaint and history recorded by the ancillary staff and/or the patient. I have independently reviewed and interpreted tests performed by other physicians and facilities as necessary. \par \par I have discussed with the patient differential diagnosis, reason for auxiliary tests if ordered, risks, benefits, alternatives, and complications of each form of therapy were discussed. \par \par \par

## 2021-11-17 NOTE — DATA REVIEWED
[FreeTextEntry1] : DEXCOM downloaded and reviewed personally 11/17/21: Glucose above goal almost 100% of the time, but less severe hyper and hypoglycemia. Pt. using control IQ with drop in glucose to goal overnight. Variable glucose values throughout the day depending on po intake and if he's bolusing the full amount.\par \par Labs 11/17/2021:\par A1c 8.1%\par \par DEXCOM downloaded and reviewed personally 12/22/20: Hyperglycemia at bedtime likely due to snacking around 10pm without insulin bolus for food. Dropping a lot overnight usually by around 200. Rare hypoglycemia. \par \par Labs 9/2020:\par A1c 7.9%\par CMP normal except glucose of 211\par AST 66\par \par \par Chol 237\par HDL 47\par \par TSH 1.87\par TPO Ab neg.\par Celiac neg.\par Micro/Cr 13\par Free T4 1.3\par Vit D 22.5\par \par DEXCOM downloaded and reviewed personally 9/21/20: Hyperglycemia after dinner likely under bolusing and eating more to get to higher glucose for bedtime. Large drop overnight with variability during the day. Rare hypoglycemia. \par \par DEXCOM downloaded and reviewed personally 5/6/20: Hyperglycemia after dinner likely under bolusing and eating more to get to higher glucose for bedtime. Large drop overnight with variability during the day. Rare hypoglycemia. \par \par DEXCOM downloaded and reviewed personally 1/28/20: Hyperglycemia after dinner likely under bolusing and high throughout the night with variability during the day. Rare hypoglycemia. \par \par A1c 1/28/20:\par A1c 9.9%\par \par Labs 8/6/19:\par A1c 8.8%\par \par DEXCOM downloaded and reviewed personally 8/6/19: Hyperglycemia less compared to previous downloads. Most of which are overnight and after dinner. No hypoglycemia. \par \par Labs 5/6/19:\par A1c 9.4%\par \par DEXCOM downloaded and reviewed personally 1/28/19: Hyperglycemia persistently, but improving without hypoglycemia.\par \par Labs 1/28/19:\par A1c 9.3%\par Glucose 189\par \par Labs 12/31/18:\par A1c 10.4%\par Glucose 325\par CMP normal\par Chol 235\par HDL 62\par \par Trig 97\par Vit D 25-OH 27\par \par DEXCOM downloaded and reviewed personally 10/29/18: Hyperglycemia without hypoglycemia.\par \par Labs 10/29/18:\par A1c 10.4%\par Glucose 242\par \par DEXCOM download: Hyperglycemia mostly after dinner. No hypoglycemia. \par \par Labs 6/19/18:\par A1c 11.2%\par Glucose 279\par Micro/cr 21\par CMP normal except glucose of 282\par \par HDL 59\par Chol 228\par Trig 144\par Vit D 27.7\par TSH 2.23\par Free T4 1.4\par TPO Ab neg.\par Celiac screen +\par \par Labs 2/7/18:\par Glucose 200\par A1c 13.7%\par \par Sensor Download 5/23/17: Glucose values 200-400 consistently\par \par Labs 5/23/17:\par A1c 12.7%\par Glucose 278\par TSH 2.26\par Free T4 1.4\par Glucose 353\par \par HDL 51\par Chol 247\par Trig 185\par Micro/Cr 19\par Vit D 23.8\par Celiac neg.\par TPO Ab neg. \par \par Labs 5/2016:\par A1c 10.1%\par Glucose 220\par \par Labs 2/22/15:\par A1c 11%\par Glucose 142\par \par Reviewed pump download 2/22/16: Most hyperglycemia in afternoon and evening- related to postprandial excursions. \par \par Labs 12/2015:\par A1c >14%\par Celiac negative\par Micro/Cr 9\par \par HDL 58\par Chol 237\par Trig 140\par CMP normal except glucose of 247\par TSH 2.14\par Free T4 1.3\par \par Reviewed pump down load with persistent hyperglycemia glucose values 200-400 without hypoglycemia.

## 2021-11-17 NOTE — PHYSICAL EXAM
[Alert] : alert [Well Nourished] : well nourished [Healthy Appearance] : healthy appearance [No Acute Distress] : no acute distress [No Proptosis] : no proptosis [Supple] : the neck was supple [Thyroid Not Enlarged] : the thyroid was not enlarged [No Respiratory Distress] : no respiratory distress [No Accessory Muscle Use] : no accessory muscle use [Normal Rate and Effort] : normal respiratory rate and effort [Clear to Auscultation] : lungs were clear to auscultation bilaterally [Normal S1, S2] : normal S1 and S2 [Normal Rate] : heart rate was normal [Regular Rhythm] : with a regular rhythm [No Edema] : no peripheral edema [Normal Bowel Sounds] : normal bowel sounds [Not Tender] : non-tender [Not Distended] : not distended [Soft] : abdomen soft [No Stigmata of Cushings Syndrome] : no stigmata of Cushings Syndrome [No Clubbing, Cyanosis] : no clubbing  or cyanosis of the fingernails [Normal Strength/Tone] : muscle strength and tone were normal [Right foot was examined, including] : right foot ~C was examined, including visual inspection with sensory and pulse exams [Left foot was examined, including] : left foot ~C was examined, including visual inspection with sensory and pulse exams [Normal] : normal [2+] : 2+ in the dorsalis pedis [Oriented x3] : oriented to person, place, and time [Normal Affect] : the affect was normal [Normal Mood] : the mood was normal [Acanthosis Nigricans] : no acanthosis nigricans [Diminished Throughout Both Feet] : normal tactile sensation with monofilament testing throughout both feet [No Motor Deficits] : the motor exam was normal [No Tremors] : no tremors

## 2021-11-18 LAB
25(OH)D3 SERPL-MCNC: 25.1 NG/ML
ALBUMIN SERPL ELPH-MCNC: 4.6 G/DL
ALP BLD-CCNC: 95 U/L
ALT SERPL-CCNC: 33 U/L
ANION GAP SERPL CALC-SCNC: 15 MMOL/L
AST SERPL-CCNC: 25 U/L
BASOPHILS # BLD AUTO: 0.02 K/UL
BASOPHILS NFR BLD AUTO: 0.2 %
BILIRUB SERPL-MCNC: 0.2 MG/DL
BUN SERPL-MCNC: 10 MG/DL
CALCIUM SERPL-MCNC: 9.9 MG/DL
CHLORIDE SERPL-SCNC: 100 MMOL/L
CHOLEST SERPL-MCNC: 188 MG/DL
CO2 SERPL-SCNC: 22 MMOL/L
CREAT SERPL-MCNC: 0.64 MG/DL
CREAT SPEC-SCNC: 122 MG/DL
EOSINOPHIL # BLD AUTO: 0.04 K/UL
EOSINOPHIL NFR BLD AUTO: 0.5 %
GLUCOSE SERPL-MCNC: 245 MG/DL
HCT VFR BLD CALC: 50.2 %
HDLC SERPL-MCNC: 45 MG/DL
HGB BLD-MCNC: 16 G/DL
IMM GRANULOCYTES NFR BLD AUTO: 0.3 %
LDLC SERPL CALC-MCNC: 120 MG/DL
LYMPHOCYTES # BLD AUTO: 1.84 K/UL
LYMPHOCYTES NFR BLD AUTO: 21 %
MAN DIFF?: NORMAL
MCHC RBC-ENTMCNC: 28.9 PG
MCHC RBC-ENTMCNC: 31.9 GM/DL
MCV RBC AUTO: 90.6 FL
MICROALBUMIN 24H UR DL<=1MG/L-MCNC: 2.1 MG/DL
MICROALBUMIN/CREAT 24H UR-RTO: 17 MG/G
MONOCYTES # BLD AUTO: 0.8 K/UL
MONOCYTES NFR BLD AUTO: 9.1 %
NEUTROPHILS # BLD AUTO: 6.05 K/UL
NEUTROPHILS NFR BLD AUTO: 68.9 %
NONHDLC SERPL-MCNC: 143 MG/DL
PLATELET # BLD AUTO: 245 K/UL
POTASSIUM SERPL-SCNC: 4.5 MMOL/L
PROT SERPL-MCNC: 7.2 G/DL
RBC # BLD: 5.54 M/UL
RBC # FLD: 12.3 %
SODIUM SERPL-SCNC: 137 MMOL/L
T4 FREE SERPL-MCNC: 1.3 NG/DL
THYROGLOB AB SERPL-ACNC: <20 IU/ML
THYROPEROXIDASE AB SERPL IA-ACNC: <10 IU/ML
TRIGL SERPL-MCNC: 117 MG/DL
TSH SERPL-ACNC: 2.17 UIU/ML
WBC # FLD AUTO: 8.78 K/UL

## 2022-05-04 ENCOUNTER — RX RENEWAL (OUTPATIENT)
Age: 27
End: 2022-05-04

## 2022-05-16 ENCOUNTER — RX RENEWAL (OUTPATIENT)
Age: 27
End: 2022-05-16

## 2022-07-11 ENCOUNTER — RX RENEWAL (OUTPATIENT)
Age: 27
End: 2022-07-11

## 2022-08-12 ENCOUNTER — RX RENEWAL (OUTPATIENT)
Age: 27
End: 2022-08-12

## 2022-09-06 ENCOUNTER — APPOINTMENT (OUTPATIENT)
Dept: ENDOCRINOLOGY | Facility: CLINIC | Age: 27
End: 2022-09-06

## 2022-11-11 ENCOUNTER — RX RENEWAL (OUTPATIENT)
Age: 27
End: 2022-11-11

## 2022-11-29 ENCOUNTER — LABORATORY RESULT (OUTPATIENT)
Age: 27
End: 2022-11-29

## 2022-11-29 ENCOUNTER — APPOINTMENT (OUTPATIENT)
Dept: ENDOCRINOLOGY | Facility: CLINIC | Age: 27
End: 2022-11-29

## 2022-11-29 VITALS
BODY MASS INDEX: 34.53 KG/M2 | DIASTOLIC BLOOD PRESSURE: 100 MMHG | HEART RATE: 116 BPM | HEIGHT: 67 IN | WEIGHT: 220 LBS | TEMPERATURE: 98.3 F | OXYGEN SATURATION: 99 % | RESPIRATION RATE: 20 BRPM | SYSTOLIC BLOOD PRESSURE: 176 MMHG

## 2022-11-29 DIAGNOSIS — E10.65 TYPE 1 DIABETES MELLITUS WITH HYPERGLYCEMIA: ICD-10-CM

## 2022-11-29 DIAGNOSIS — I10 ESSENTIAL (PRIMARY) HYPERTENSION: ICD-10-CM

## 2022-11-29 DIAGNOSIS — Z23 ENCOUNTER FOR IMMUNIZATION: ICD-10-CM

## 2022-11-29 DIAGNOSIS — Z46.81 ENCOUNTER FOR FITTING AND ADJUSTMENT OF INSULIN PUMP: ICD-10-CM

## 2022-11-29 LAB — HBA1C MFR BLD HPLC: 8.5

## 2022-11-29 PROCEDURE — 95251 CONT GLUC MNTR ANALYSIS I&R: CPT

## 2022-11-29 PROCEDURE — G0008: CPT

## 2022-11-29 PROCEDURE — 90686 IIV4 VACC NO PRSV 0.5 ML IM: CPT

## 2022-11-29 PROCEDURE — 99215 OFFICE O/P EST HI 40 MIN: CPT | Mod: 25

## 2022-11-29 PROCEDURE — 83036 HEMOGLOBIN GLYCOSYLATED A1C: CPT | Mod: QW

## 2022-11-29 NOTE — ASSESSMENT
[Carbohydrate Consistent Diet] : carbohydrate consistent diet [Hypoglycemia Management] : hypoglycemia management [Diabetes Foot Care] : diabetes foot care [Long Term Vascular Complications] : long term vascular complications of diabetes [Importance of Diet and Exercise] : importance of diet and exercise to improve glycemic control, achieve weight loss and improve cardiovascular health [Self Monitoring of Blood Glucose] : self monitoring of blood glucose [Retinopathy Screening] : Patient was referred to ophthalmology for retinopathy screening [FreeTextEntry1] : 28 y/o M w/ uncontrolled Type 1 DM on insulin pump slowly improving with Tadem control IQ. Discussed the importance of improved glycemic control to prevent long term complications from diabetes. Goal A1c <7%.Also with improved adherence of carb counting for fear of hypoglycemia. Avoid bedtime snacking or, if snacking, give a bolus of insulin. Basal rate as indicated:\par \par Basal Rate: \par Start Time: 12:00 AM -----1.3 units/hour \par Start Time: 630A -----  1.8 units/hour \par Start Time: 9A -----  2.0 units/hour \par Start Time: 1P ----- 2.1 units/hour \par Start Time: 1030P -----  2.1 units/hour \par   \par Insulin to Carb Ration (I:C): \par Start Time: 12:00 AM ----- 9 \par Start Time: -----  units/hour \par Start Time: 9A -----  2.5 units/hour \par Start Time: 130PM -----  3.5 units/hour   \par 10pm 9   \par Insulin Sensitivity Factor = 12 A 50, 630A 30, 10 P 50 \par BG Target = 140 \par Insulin on Board (IOB) Duration = 4 hours \par Auto off set = 18 hours \par Maximum Basal Rate = units/hour \par Maximum Bolus = 20 units \par \par \par Dietary and lifestyle modifications discussed. Goal glucose values reviewed. Hypoglycemia management discussed. BP at goal. micro/cr at goal and negative 9/2020. Has glucagon and ketone strips. Medical alert bracelet recommended. Optho follow-up for retinopathy screening. \par GI follow-up for celiac + screen\par \par HTN- Goal BP <130/80, screen for hyperaldo and pheo given young age and high BP readings. Start lisinopril 10 mg daily. \par \par Influenza Vaccine given today\par \par Prep time with review of labs and interval progress notes and consultations \par Discussion with patient regarding diabetes management plan, risks of hyper and hypoglycemia, treatment options and goals of care answering all patients questions and addressing all concerns \par Insulin pump and Sensor download and review with discussion with patient\par Discussion of new diagnosis of uncontrolled HTN\par Post-visit completion charting and review \par Total Time 42 min\par \par Specifically causes, evaluation, treatment options, risks, complications, and benefits of available therapies were discussed. Questions were answered.\par \par The submitted E/M billing level for this visit reflects the total time spent on the day of the visit including face-to-face time spent with the patient, non-face-to-face review of medical records and relevant information, documentation, and asynchronous communication with the patient after a visit via phone, email, or patient’s EHR portal after the visit. \par The medical records reviewed are either scanned into the chart or reviewed with the patient using a patient’s electronic medical records portal for patients with records not available to [facility] via electronic transmission platforms from other institutions and labs. \par Time spend counseling and performing coordination of care was also included in determining the appropriate EM billing level.\par \par I have reviewed and verified information regarding the chief complaint and history recorded by the ancillary staff and/or the patient. I have independently reviewed and interpreted tests performed by other physicians and facilities as necessary. \par \par I have discussed with the patient differential diagnosis, reason for auxiliary tests if ordered, risks, benefits, alternatives, and complications of each form of therapy were discussed. \par \par \par

## 2022-11-29 NOTE — DATA REVIEWED
[FreeTextEntry1] : DEXCOM downloaded and reviewed personally 11/29/22: Glucose above goal almost 100% of the time. Pt. using control IQ with drop in glucose to goal overnight. Variable glucose values throughout the day depending on po intake and if he's bolusing the full amount.\par \par Labs 11/29/2022:\par A1c 8.5%\par \par DEXCOM downloaded and reviewed personally 11/17/21: Glucose above goal almost 100% of the time, but less severe hyper and hypoglycemia. Pt. using control IQ with drop in glucose to goal overnight. Variable glucose values throughout the day depending on po intake and if he's bolusing the full amount.\par \par Labs 11/17/2021:\par A1c 8.1%\par \par DEXCOM downloaded and reviewed personally 12/22/20: Hyperglycemia at bedtime likely due to snacking around 10pm without insulin bolus for food. Dropping a lot overnight usually by around 200. Rare hypoglycemia. \par \par Labs 9/2020:\par A1c 7.9%\par CMP normal except glucose of 211\par AST 66\par \par \par Chol 237\par HDL 47\par \par TSH 1.87\par TPO Ab neg.\par Celiac neg.\par Micro/Cr 13\par Free T4 1.3\par Vit D 22.5\par \par DEXCOM downloaded and reviewed personally 9/21/20: Hyperglycemia after dinner likely under bolusing and eating more to get to higher glucose for bedtime. Large drop overnight with variability during the day. Rare hypoglycemia. \par \par DEXCOM downloaded and reviewed personally 5/6/20: Hyperglycemia after dinner likely under bolusing and eating more to get to higher glucose for bedtime. Large drop overnight with variability during the day. Rare hypoglycemia. \par \par DEXCOM downloaded and reviewed personally 1/28/20: Hyperglycemia after dinner likely under bolusing and high throughout the night with variability during the day. Rare hypoglycemia. \par \par A1c 1/28/20:\par A1c 9.9%\par \par Labs 8/6/19:\par A1c 8.8%\par \par DEXCOM downloaded and reviewed personally 8/6/19: Hyperglycemia less compared to previous downloads. Most of which are overnight and after dinner. No hypoglycemia. \par \par Labs 5/6/19:\par A1c 9.4%\par \par DEXCOM downloaded and reviewed personally 1/28/19: Hyperglycemia persistently, but improving without hypoglycemia.\par \par Labs 1/28/19:\par A1c 9.3%\par Glucose 189\par \par Labs 12/31/18:\par A1c 10.4%\par Glucose 325\par CMP normal\par Chol 235\par HDL 62\par \par Trig 97\par Vit D 25-OH 27\par \par DEXCOM downloaded and reviewed personally 10/29/18: Hyperglycemia without hypoglycemia.\par \par Labs 10/29/18:\par A1c 10.4%\par Glucose 242\par \par DEXCOM download: Hyperglycemia mostly after dinner. No hypoglycemia. \par \par Labs 6/19/18:\par A1c 11.2%\par Glucose 279\par Micro/cr 21\par CMP normal except glucose of 282\par \par HDL 59\par Chol 228\par Trig 144\par Vit D 27.7\par TSH 2.23\par Free T4 1.4\par TPO Ab neg.\par Celiac screen +\par \par Labs 2/7/18:\par Glucose 200\par A1c 13.7%\par \par Sensor Download 5/23/17: Glucose values 200-400 consistently\par \par Labs 5/23/17:\par A1c 12.7%\par Glucose 278\par TSH 2.26\par Free T4 1.4\par Glucose 353\par \par HDL 51\par Chol 247\par Trig 185\par Micro/Cr 19\par Vit D 23.8\par Celiac neg.\par TPO Ab neg. \par \par Labs 5/2016:\par A1c 10.1%\par Glucose 220\par \par Labs 2/22/15:\par A1c 11%\par Glucose 142\par \par Reviewed pump download 2/22/16: Most hyperglycemia in afternoon and evening- related to postprandial excursions. \par \par Labs 12/2015:\par A1c >14%\par Celiac negative\par Micro/Cr 9\par \par HDL 58\par Chol 237\par Trig 140\par CMP normal except glucose of 247\par TSH 2.14\par Free T4 1.3\par \par Reviewed pump down load with persistent hyperglycemia glucose values 200-400 without hypoglycemia.

## 2022-11-29 NOTE — PHYSICAL EXAM
[Alert] : alert [Well Nourished] : well nourished [Healthy Appearance] : healthy appearance [No Acute Distress] : no acute distress [No Proptosis] : no proptosis [Supple] : the neck was supple [Thyroid Not Enlarged] : the thyroid was not enlarged [No Respiratory Distress] : no respiratory distress [No Accessory Muscle Use] : no accessory muscle use [Normal Rate and Effort] : normal respiratory rate and effort [Clear to Auscultation] : lungs were clear to auscultation bilaterally [Normal S1, S2] : normal S1 and S2 [Normal Rate] : heart rate was normal [Regular Rhythm] : with a regular rhythm [No Edema] : no peripheral edema [Normal Bowel Sounds] : normal bowel sounds [Not Tender] : non-tender [Not Distended] : not distended [Soft] : abdomen soft [No Stigmata of Cushings Syndrome] : no stigmata of Cushings Syndrome [No Clubbing, Cyanosis] : no clubbing  or cyanosis of the fingernails [Normal Strength/Tone] : muscle strength and tone were normal [Normal] : normal [2+] : 2+ in the dorsalis pedis [No Motor Deficits] : the motor exam was normal [No Tremors] : no tremors [Oriented x3] : oriented to person, place, and time [Normal Affect] : the affect was normal [Normal Mood] : the mood was normal [Acanthosis Nigricans] : no acanthosis nigricans [Diminished Throughout Both Feet] : normal tactile sensation with monofilament testing throughout both feet

## 2022-11-29 NOTE — HISTORY OF PRESENT ILLNESS
[FreeTextEntry1] : Magdaleno is a 26 y/o male with type 1 diabetes diagnosed in 2010 with symptoms of polydipsia found to have hyperglycemia on labs. No known complications. Sent to [facility]. Discharged on Lantus and Novolog. Never in DKA. Was on the Medtronic pump from [id number] now on T-Slim since 12/2018 with Novolog. Also using control IQ.  \par \par Pump Settings: \par Basal Rate: \par Start Time: 12:00 AM ----- 1.3 units/hour \par Start Time: 630A -----  1.8 units/hour \par Start Time: 9A -----  2.0 units/hour \par Start Time: 1P ----- 2.1 units/hour \par Start Time: 1000P -----  2.1 units/hour \par  \par Insulin to Carb Ration (I:C): \par Start Time: 12:00 AM ----- 9 units/hour \par Start Time: -----  units/hour \par Start Time: 9A -----  2.5 units/hour \par Start Time: 130PM -----  3.5 units/hour   \par 10pm 9    units/hour  \par Insulin Sensitivity Factor = 12 A 50, 630A 30, 10 P 50 \par BG Target = 140 \par Insulin on Board (IOB) Duration = 4 hours \par Auto off set = 18 hours \par Maximum Basal Rate = units/hour \par Maximum Bolus = 20 units \par \par \par He used to follow with Dr. Galen Lawson, transitioned to adult endocrine 2/14/15 with A1c of >14%. due to fear of hypoglycemia. He was not giving himself full bolus doses and basal rates. Now switched to Tandem with control IQ with improvement in glycemic control and less fear of hypoglycemia. Knows how carb count. Uses bolus wizard. Now giving full bolus at breakfast and trying to give at least 80% of bolus at snacks and lunch and dinner for fear of going low. Now feels more comfortable going to bed with glucose values in the 100's as he has the sensor, but still wont go to sleep unless glucose is >200. Will typically eat something around 10pm to avoid going to bed with lower glucose. Usually does not give insulin for these snacks.Tries to monitor carbs. No soda. +juice if thinks he will go low. Has a snack at bedtime for fear of hypoglycemia. The pump insertion site is changed every 2 days. Site of injection/insertion: abdomen. No polyuria or polydipsia. Denies nausea or vomiting. The patient has glucagon at home, but has no history of hypoglycemic seizure. Does not have medical alert bracelet. Had a baby boy 5/2018. \par  \par +Celiac screen, has not yet seen GI. Cut out cake and some carbs from diet with improvement in abdominal symptoms. \par +optho 6/2021

## 2022-12-05 LAB
ALBUMIN SERPL ELPH-MCNC: 4.9 G/DL
ALDOSTERONE SERUM: 5.8 NG/DL
ALP BLD-CCNC: 107 U/L
ALT SERPL-CCNC: 40 U/L
ANION GAP SERPL CALC-SCNC: 13 MMOL/L
AST SERPL-CCNC: 28 U/L
BASOPHILS # BLD AUTO: 0.04 K/UL
BASOPHILS NFR BLD AUTO: 0.3 %
BILIRUB SERPL-MCNC: 0.4 MG/DL
BUN SERPL-MCNC: 11 MG/DL
CALCIUM SERPL-MCNC: 9.9 MG/DL
CHLORIDE SERPL-SCNC: 98 MMOL/L
CHOLEST SERPL-MCNC: 246 MG/DL
CO2 SERPL-SCNC: 24 MMOL/L
CREAT SERPL-MCNC: 0.6 MG/DL
CREAT SPEC-SCNC: 28 MG/DL
EGFR: 136 ML/MIN/1.73M2
EOSINOPHIL # BLD AUTO: 0.01 K/UL
EOSINOPHIL NFR BLD AUTO: 0.1 %
GLUCOSE SERPL-MCNC: 298 MG/DL
HCT VFR BLD CALC: 46.2 %
HDLC SERPL-MCNC: 53 MG/DL
HGB BLD-MCNC: 15.6 G/DL
IMM GRANULOCYTES NFR BLD AUTO: 0.4 %
LDLC SERPL CALC-MCNC: 161 MG/DL
LYMPHOCYTES # BLD AUTO: 1.45 K/UL
LYMPHOCYTES NFR BLD AUTO: 10 %
MAN DIFF?: NORMAL
MCHC RBC-ENTMCNC: 28.1 PG
MCHC RBC-ENTMCNC: 33.8 GM/DL
MCV RBC AUTO: 83.2 FL
MICROALBUMIN 24H UR DL<=1MG/L-MCNC: 1.2 MG/DL
MICROALBUMIN/CREAT 24H UR-RTO: 44 MG/G
MONOCYTES # BLD AUTO: 0.51 K/UL
MONOCYTES NFR BLD AUTO: 3.5 %
NEUTROPHILS # BLD AUTO: 12.5 K/UL
NEUTROPHILS NFR BLD AUTO: 85.7 %
NONHDLC SERPL-MCNC: 193 MG/DL
PLATELET # BLD AUTO: 265 K/UL
POTASSIUM SERPL-SCNC: 4.6 MMOL/L
PROT SERPL-MCNC: 7.6 G/DL
RBC # BLD: 5.55 M/UL
RBC # FLD: 12.2 %
SODIUM SERPL-SCNC: 135 MMOL/L
T4 FREE SERPL-MCNC: 1.3 NG/DL
THYROGLOB AB SERPL-ACNC: <20 IU/ML
THYROPEROXIDASE AB SERPL IA-ACNC: <10 IU/ML
TRIGL SERPL-MCNC: 164 MG/DL
TSH SERPL-ACNC: 1.74 UIU/ML
WBC # FLD AUTO: 14.57 K/UL

## 2022-12-08 LAB
METANEPHRINE, PL: 37.6 PG/ML
NORMETANEPHRINE, PL: 103.4 PG/ML

## 2023-01-11 ENCOUNTER — RX RENEWAL (OUTPATIENT)
Age: 28
End: 2023-01-11

## 2023-01-12 ENCOUNTER — RX RENEWAL (OUTPATIENT)
Age: 28
End: 2023-01-12

## 2023-01-16 ENCOUNTER — RX RENEWAL (OUTPATIENT)
Age: 28
End: 2023-01-16

## 2023-02-28 ENCOUNTER — APPOINTMENT (OUTPATIENT)
Dept: ENDOCRINOLOGY | Facility: CLINIC | Age: 28
End: 2023-02-28

## 2023-05-18 RX ORDER — ASPIRIN 81 MG
81 TABLET, DELAYED RELEASE (ENTERIC COATED) ORAL DAILY
Qty: 30 | Refills: 3 | Status: ACTIVE | COMMUNITY
Start: 2022-12-28 | End: 1900-01-01

## 2023-05-19 RX ORDER — GLUCAGON 3 MG/1
3 POWDER NASAL
Qty: 1 | Refills: 3 | Status: ACTIVE | COMMUNITY
Start: 2023-05-18 | End: 1900-01-01

## 2023-06-13 ENCOUNTER — RX RENEWAL (OUTPATIENT)
Age: 28
End: 2023-06-13

## 2023-06-19 ENCOUNTER — RX RENEWAL (OUTPATIENT)
Age: 28
End: 2023-06-19

## 2023-07-20 ENCOUNTER — RX RENEWAL (OUTPATIENT)
Age: 28
End: 2023-07-20

## 2023-09-12 ENCOUNTER — RX RENEWAL (OUTPATIENT)
Age: 28
End: 2023-09-12

## 2023-10-23 ENCOUNTER — RX RENEWAL (OUTPATIENT)
Age: 28
End: 2023-10-23

## 2023-11-15 ENCOUNTER — RX RENEWAL (OUTPATIENT)
Age: 28
End: 2023-11-15

## 2024-01-19 ENCOUNTER — RX RENEWAL (OUTPATIENT)
Age: 29
End: 2024-01-19

## 2024-01-19 RX ORDER — LISINOPRIL 10 MG/1
10 TABLET ORAL DAILY
Qty: 30 | Refills: 5 | Status: ACTIVE | COMMUNITY
Start: 2022-11-29 | End: 1900-01-01

## 2024-04-10 ENCOUNTER — RX RENEWAL (OUTPATIENT)
Age: 29
End: 2024-04-10

## 2024-04-10 RX ORDER — BLOOD-GLUCOSE TRANSMITTER
EACH MISCELLANEOUS
Qty: 1 | Refills: 2 | Status: ACTIVE | COMMUNITY
Start: 2018-08-06 | End: 1900-01-01

## 2024-04-10 RX ORDER — BLOOD-GLUCOSE SENSOR
EACH MISCELLANEOUS
Qty: 3 | Refills: 4 | Status: ACTIVE | COMMUNITY
Start: 2023-07-19 | End: 1900-01-01

## 2024-04-17 RX ORDER — INFUSION SET FOR INSULIN PUMP
INFUSION SETS-PARAPHERNALIA MISCELLANEOUS
Qty: 5 | Refills: 3 | Status: ACTIVE | COMMUNITY
Start: 2019-07-19 | End: 1900-01-01

## 2024-04-17 RX ORDER — BLOOD-GLUCOSE SENSOR
EACH MISCELLANEOUS
Qty: 3 | Refills: 5 | Status: DISCONTINUED | COMMUNITY
Start: 2018-08-06 | End: 2024-04-17

## 2024-04-17 RX ORDER — INSULIN LISPRO 100 [IU]/ML
100 INJECTION, SOLUTION INTRAVENOUS; SUBCUTANEOUS
Qty: 5 | Refills: 5 | Status: DISCONTINUED | COMMUNITY
Start: 2020-11-02 | End: 2024-04-17

## 2024-04-17 RX ORDER — INFUSION SET FOR INSULIN PUMP
INFUSION SETS-PARAPHERNALIA MISCELLANEOUS
Qty: 20 | Refills: 5 | Status: DISCONTINUED | COMMUNITY
Start: 2023-07-20 | End: 2024-04-17

## 2024-04-17 RX ORDER — INSULIN PUMP CARTRIDGE
CARTRIDGE (EA) SUBCUTANEOUS
Qty: 5 | Refills: 3 | Status: ACTIVE | COMMUNITY
Start: 2019-07-19 | End: 1900-01-01

## 2024-05-16 ENCOUNTER — RX RENEWAL (OUTPATIENT)
Age: 29
End: 2024-05-16

## 2024-05-16 RX ORDER — INSULIN LISPRO 100 [IU]/ML
100 INJECTION, SOLUTION INTRAVENOUS; SUBCUTANEOUS
Qty: 60 | Refills: 4 | Status: ACTIVE | COMMUNITY
Start: 2022-07-11 | End: 1900-01-01

## 2024-08-26 ENCOUNTER — RX RENEWAL (OUTPATIENT)
Age: 29
End: 2024-08-26

## 2024-08-26 RX ORDER — ASPIRIN 81 MG/1
81 TABLET, COATED ORAL DAILY
Qty: 30 | Refills: 4 | Status: ACTIVE | COMMUNITY
Start: 2024-08-26 | End: 1900-01-01

## 2024-10-07 ENCOUNTER — RX RENEWAL (OUTPATIENT)
Age: 29
End: 2024-10-07

## 2024-11-08 ENCOUNTER — RX RENEWAL (OUTPATIENT)
Age: 29
End: 2024-11-08

## 2024-11-11 ENCOUNTER — RX RENEWAL (OUTPATIENT)
Age: 29
End: 2024-11-11

## 2024-11-11 RX ORDER — BLOOD-GLUCOSE SENSOR
EACH MISCELLANEOUS
Qty: 3 | Refills: 0 | Status: ACTIVE | COMMUNITY
Start: 2024-11-11 | End: 1900-01-01

## 2024-11-11 RX ORDER — BLOOD-GLUCOSE TRANSMITTER
EACH MISCELLANEOUS
Qty: 1 | Refills: 0 | Status: ACTIVE | COMMUNITY
Start: 2024-11-11 | End: 1900-01-01

## 2024-12-10 ENCOUNTER — APPOINTMENT (OUTPATIENT)
Dept: ENDOCRINOLOGY | Facility: CLINIC | Age: 29
End: 2024-12-10
Payer: MEDICAID

## 2024-12-10 VITALS
WEIGHT: 213 LBS | HEART RATE: 118 BPM | OXYGEN SATURATION: 99 % | BODY MASS INDEX: 33.43 KG/M2 | SYSTOLIC BLOOD PRESSURE: 140 MMHG | DIASTOLIC BLOOD PRESSURE: 80 MMHG | HEIGHT: 67 IN

## 2024-12-10 DIAGNOSIS — E10.65 TYPE 1 DIABETES MELLITUS WITH HYPERGLYCEMIA: ICD-10-CM

## 2024-12-10 DIAGNOSIS — I10 ESSENTIAL (PRIMARY) HYPERTENSION: ICD-10-CM

## 2024-12-10 DIAGNOSIS — Z46.81 ENCOUNTER FOR FITTING AND ADJUSTMENT OF INSULIN PUMP: ICD-10-CM

## 2024-12-10 LAB — HBA1C MFR BLD HPLC: 8.2

## 2024-12-10 PROCEDURE — 83036 HEMOGLOBIN GLYCOSYLATED A1C: CPT | Mod: QW

## 2024-12-10 PROCEDURE — 95251 CONT GLUC MNTR ANALYSIS I&R: CPT

## 2024-12-10 PROCEDURE — 99215 OFFICE O/P EST HI 40 MIN: CPT

## 2024-12-11 RX ORDER — BLOOD SUGAR DIAGNOSTIC
STRIP MISCELLANEOUS
Qty: 600 | Refills: 3 | Status: ACTIVE | COMMUNITY
Start: 2024-12-10 | End: 1900-01-01

## 2024-12-11 RX ORDER — BLOOD-GLUCOSE METER
W/DEVICE KIT MISCELLANEOUS
Qty: 1 | Refills: 0 | Status: ACTIVE | COMMUNITY
Start: 2024-12-10 | End: 1900-01-01

## 2024-12-13 ENCOUNTER — RX RENEWAL (OUTPATIENT)
Age: 29
End: 2024-12-13

## 2025-02-05 ENCOUNTER — APPOINTMENT (OUTPATIENT)
Dept: ENDOCRINOLOGY | Facility: CLINIC | Age: 30
End: 2025-02-05

## 2025-02-05 PROCEDURE — P0023: CPT

## 2025-02-10 ENCOUNTER — RX RENEWAL (OUTPATIENT)
Age: 30
End: 2025-02-10

## 2025-02-12 RX ORDER — INSULIN ASPART INJECTION 100 [IU]/ML
100 INJECTION, SOLUTION SUBCUTANEOUS
Qty: 10 | Refills: 3 | Status: ACTIVE | COMMUNITY
Start: 2025-02-12 | End: 1900-01-01

## 2025-03-21 ENCOUNTER — RX RENEWAL (OUTPATIENT)
Age: 30
End: 2025-03-21

## 2025-03-27 ENCOUNTER — RX RENEWAL (OUTPATIENT)
Age: 30
End: 2025-03-27

## 2025-04-08 ENCOUNTER — APPOINTMENT (OUTPATIENT)
Dept: ENDOCRINOLOGY | Facility: CLINIC | Age: 30
End: 2025-04-08

## 2025-05-12 ENCOUNTER — RX RENEWAL (OUTPATIENT)
Age: 30
End: 2025-05-12

## 2025-05-23 RX ORDER — BLOOD-GLUCOSE SENSOR
EACH MISCELLANEOUS
Qty: 9 | Refills: 1 | Status: ACTIVE | COMMUNITY
Start: 2025-05-23 | End: 1900-01-01

## 2025-06-17 ENCOUNTER — RX RENEWAL (OUTPATIENT)
Age: 30
End: 2025-06-17

## 2025-08-25 ENCOUNTER — RX RENEWAL (OUTPATIENT)
Age: 30
End: 2025-08-25